# Patient Record
Sex: FEMALE | Race: WHITE | NOT HISPANIC OR LATINO | Employment: OTHER | URBAN - METROPOLITAN AREA
[De-identification: names, ages, dates, MRNs, and addresses within clinical notes are randomized per-mention and may not be internally consistent; named-entity substitution may affect disease eponyms.]

---

## 2017-03-14 PROBLEM — S72.142A FRACTURE, INTERTROCHANTERIC, LEFT FEMUR: Status: ACTIVE | Noted: 2017-03-14

## 2017-04-05 ENCOUNTER — OFFICE VISIT (OUTPATIENT)
Dept: ORTHOPEDICS | Facility: CLINIC | Age: 77
End: 2017-04-05
Payer: MEDICARE

## 2017-04-05 VITALS — BODY MASS INDEX: 16.83 KG/M2 | WEIGHT: 95 LBS | HEIGHT: 63 IN

## 2017-04-05 DIAGNOSIS — Z98.890 S/P ORIF (OPEN REDUCTION INTERNAL FIXATION) FRACTURE: Primary | ICD-10-CM

## 2017-04-05 DIAGNOSIS — Z87.81 S/P ORIF (OPEN REDUCTION INTERNAL FIXATION) FRACTURE: Primary | ICD-10-CM

## 2017-04-05 PROCEDURE — 99212 OFFICE O/P EST SF 10 MIN: CPT | Mod: PBBFAC,PO | Performed by: ORTHOPAEDIC SURGERY

## 2017-04-05 PROCEDURE — 99024 POSTOP FOLLOW-UP VISIT: CPT | Mod: ,,, | Performed by: ORTHOPAEDIC SURGERY

## 2017-04-05 PROCEDURE — 99999 PR PBB SHADOW E&M-EST. PATIENT-LVL II: CPT | Mod: PBBFAC,,, | Performed by: ORTHOPAEDIC SURGERY

## 2017-04-05 NOTE — PROGRESS NOTES
Antonia Case, a few weeks out from ORIF of her intertrochanteric femur fracture   with a [employer] TFN.  She is doing well.  Sutures removed.  She has severe   Alzheimer's disease, but her  showed me a video of her walking in the   yard without any assistive device doing quite well.    At this point, we will continue with activities to tolerance.  We will check her   back in a month's time as a postoperative visit with x-rays of her left femur.      FLYNN/ADILIA  dd: 04/05/2017 10:29:22 (CDT)  td: 04/05/2017 17:35:58 (CDT)  Doc ID   #[id number]  Job ID #[id number]    CC:

## 2017-04-23 PROBLEM — E07.9 THYROID DISEASE: Status: ACTIVE | Noted: 2017-04-23

## 2017-04-23 PROBLEM — F02.80 ALZHEIMER DISEASE: Status: ACTIVE | Noted: 2017-04-23

## 2017-04-23 PROBLEM — S72.22XA CLOSED LEFT SUBTROCHANTERIC FEMUR FRACTURE: Status: ACTIVE | Noted: 2017-04-23

## 2017-04-23 PROBLEM — G30.9 ALZHEIMER DISEASE: Status: ACTIVE | Noted: 2017-04-23

## 2017-04-23 PROBLEM — D64.9 NORMOCYTIC ANEMIA: Status: ACTIVE | Noted: 2017-04-23

## 2017-04-24 PROBLEM — D62 ACUTE BLOOD LOSS ANEMIA: Status: ACTIVE | Noted: 2017-04-24

## 2017-05-10 DIAGNOSIS — G89.18 POST-OP PAIN: Primary | ICD-10-CM

## 2017-05-12 ENCOUNTER — OFFICE VISIT (OUTPATIENT)
Dept: ORTHOPEDICS | Facility: CLINIC | Age: 77
End: 2017-05-12
Payer: MEDICARE

## 2017-05-12 ENCOUNTER — HOSPITAL ENCOUNTER (OUTPATIENT)
Dept: RADIOLOGY | Facility: HOSPITAL | Age: 77
Discharge: HOME | End: 2017-05-12
Attending: ORTHOPAEDIC SURGERY
Payer: MEDICARE

## 2017-05-12 VITALS
BODY MASS INDEX: 18.43 KG/M2 | DIASTOLIC BLOOD PRESSURE: 88 MMHG | HEIGHT: 63 IN | WEIGHT: 104 LBS | HEART RATE: 72 BPM | SYSTOLIC BLOOD PRESSURE: 166 MMHG

## 2017-05-12 DIAGNOSIS — G89.18 POST-OP PAIN: ICD-10-CM

## 2017-05-12 DIAGNOSIS — M89.8X5 PAIN OF LEFT FEMUR: Primary | ICD-10-CM

## 2017-05-12 DIAGNOSIS — Z98.890 S/P ORIF (OPEN REDUCTION INTERNAL FIXATION) FRACTURE: ICD-10-CM

## 2017-05-12 DIAGNOSIS — G89.18 POST-OP PAIN: Primary | ICD-10-CM

## 2017-05-12 DIAGNOSIS — T84.115A: ICD-10-CM

## 2017-05-12 DIAGNOSIS — Z87.81 S/P ORIF (OPEN REDUCTION INTERNAL FIXATION) FRACTURE: ICD-10-CM

## 2017-05-12 PROCEDURE — 99213 OFFICE O/P EST LOW 20 MIN: CPT | Mod: PBBFAC,25,PO | Performed by: ORTHOPAEDIC SURGERY

## 2017-05-12 PROCEDURE — 73552 X-RAY EXAM OF FEMUR 2/>: CPT | Mod: 26,GV,LT, | Performed by: RADIOLOGY

## 2017-05-12 PROCEDURE — 99999 PR PBB SHADOW E&M-EST. PATIENT-LVL III: CPT | Mod: PBBFAC,,, | Performed by: ORTHOPAEDIC SURGERY

## 2017-05-12 PROCEDURE — 99024 POSTOP FOLLOW-UP VISIT: CPT | Mod: ,,, | Performed by: ORTHOPAEDIC SURGERY

## 2017-05-12 NOTE — MR AVS SNAPSHOT
[facility]  [address] [facility] [address]  [place] [address]  Phone: [phone]                  Antonia Case   2017 10:00 AM   Office Visit    Description:  Female : 1940   Provider:  Jorge Stevenson MD   Department:  [place] [facility]           Reason for Visit     Left Hip - Post-op Evaluation           Diagnoses this Visit        Comments    Post-op pain    -  Primary     S/P ORIF (open reduction internal fixation) fracture                To Do List           Future Appointments        Provider Department Dept Phone    2017 10:15 AM Jorge Stevenson MD [facility] [phone]      Goals (5 Years of Data)     None      [facility]     [facility] -  Assistance  Unless otherwise directed by your provider, please contact [facility], our nurse care line that is available for  assistance.     Registered nurses in the [facility] provide: appointment scheduling, clinical advisement, health education, and other advisory services.  Call: [phone] (toll free)               Medications           Message regarding Medications     Verify the changes and/or additions to your medication regime listed below are the same as discussed with your clinician today.  If any of these changes or additions are incorrect, please notify your healthcare provider.             Verify that the below list of medications is an accurate representation of the medications you are currently taking.  If none reported, the list may be blank. If incorrect, please contact your healthcare provider. Carry this list with you in case of emergency.           Current Medications     ,d,-limonene flavor, bulk, 100 % Liqd Take 1 capsule by mouth once daily.    alprazolam (XANAX) 0.5 MG tablet Take 0.25 mg by mouth 2 (two) times daily. 0.25mg  With breakfast and lunch routinely and PRN for combative/frantic behavior.    ASCORBATE CALCIUM (VITAMIN C ORAL)  "Take by mouth.    aspirin 325 MG tablet Take 1 tablet (325 mg total) by mouth 2 (two) times daily. For 30 days, then decrease back to 81 mg PO daily.    CALCIUM CARBONATE/VITAMIN D3 (VITAMIN D-3 ORAL) Take by mouth.    docusate sodium (COLACE) 100 MG capsule Take 1 capsule (100 mg total) by mouth 2 (two) times daily as needed for Constipation.    escitalopram oxalate (LEXAPRO) 10 MG tablet Take 10 mg by mouth once daily.    ferrous gluconate (FERGON) 324 MG tablet Take 1 tablet (324 mg total) by mouth daily with breakfast.    flaxseed oil 1,000 mg Cap Take 1,000 mg by mouth once daily.    galantamine (RAZADYNE) 4 MG Tab Take 4 mg by mouth 2 (two) times daily.    hydrocodone-acetaminophen 5-325mg (NORCO) 5-325 mg per tablet Take 1 tablet by mouth every 6 (six) hours as needed.    levothyroxine (SYNTHROID) 75 MCG tablet Take 75 mcg by mouth. M, W, F    multivitamin (THERAGRAN) per tablet Take 1 tablet by mouth once daily.    ondansetron (ZOFRAN-ODT) 4 MG TbDL Take 1 tablet (4 mg total) by mouth every 8 (eight) hours as needed.    quetiapine (SEROQUEL) 25 MG Tab Take 25 mg by mouth nightly as needed.     turmeric root extract 500 mg Cap Take 500 mg by mouth once daily.    TURMERIC, BULK, MISC 1 tablet by Misc.(Non-Drug; Combo Route) route once daily. Mix in cereal @@ breakfast    VITAMIN B COMPLEX ORAL Take 1 tablet by mouth once daily.           Clinical Reference Information           Your Vitals Were     BP Pulse Height Weight BMI    166/88 72 5' 3" (1.6 m) 47.2 kg (104 lb) 18.42 kg/m2      Blood Pressure          Most Recent Value    BP  (!)  166/88      Allergies as of 5/12/2017     No Known Allergies      Immunizations Administered on Date of Encounter - 5/12/2017     None      Language Assistance Services     ATTENTION: Language assistance services are available, free of charge. Please call [phone].      ATENCIÓN: Si habla mila, tiene a hammer disposición servicios gratuitos de asistencia lingüística. " Jacob bowden [phone].     RAY Ý: N?u b?n nói Ti?ng Vi?t, có các d?ch v? h? tr? ngôn ng? mi?n phí dành cho b?n. G?i s? [phone].         [facility] complies with applicable Federal civil rights laws and does not discriminate on the basis of race, color, national origin, age, disability, or sex.

## 2017-05-16 NOTE — PROGRESS NOTES
Subjective:          Chief Complaint: Antonia Case is a 76 y.o. female who had concerns including Post-op Evaluation of the Left Hip.    HPI Comments: Ms. Knight is here for f//u after her left femur surgery. She fell previously and sustained a left IT fracture of the hip which was treated with a femoral nailing. She was doing well until she subsequently fell and fracture her femoral shaft at the end of the previously placed IM nail. The previous nail was removed and the femur was then treated with a  Long IM CM nail. She was doing well and progressing with ambulation however last Friday her family noted that she regressed and is not ambulating as readily. She is however doing a little better today.     She is non-verbal and has a diagnosis of advanced dementia. She requires 24 hr monitoring in order to protect herself from injury per her .        Past Medical History:   Diagnosis Date    Alzheimer disease     Thyroid disease        Past Surgical History:   Procedure Laterality Date    KNEE SURGERY         No family history on file.      Current Outpatient Prescriptions:     ,d,-limonene flavor, bulk, 100 % Liqd, Take 1 capsule by mouth once daily., Disp: , Rfl:     alprazolam (XANAX) 0.5 MG tablet, Take 0.25 mg by mouth 2 (two) times daily. 0.25mg  With breakfast and lunch routinely and PRN for combative/frantic behavior., Disp: , Rfl:     ASCORBATE CALCIUM (VITAMIN C ORAL), Take by mouth., Disp: , Rfl:     aspirin 325 MG tablet, Take 1 tablet (325 mg total) by mouth 2 (two) times daily. For 30 days, then decrease back to 81 mg PO daily., Disp: , Rfl: 0    CALCIUM CARBONATE/VITAMIN D3 (VITAMIN D-3 ORAL), Take by mouth., Disp: , Rfl:     docusate sodium (COLACE) 100 MG capsule, Take 1 capsule (100 mg total) by mouth 2 (two) times daily as needed for Constipation., Disp: , Rfl: 0    escitalopram oxalate (LEXAPRO) 10 MG tablet, Take 10 mg by mouth once daily., Disp: , Rfl:     ferrous gluconate (FERGON)  324 MG tablet, Take 1 tablet (324 mg total) by mouth daily with breakfast., Disp: , Rfl:     flaxseed oil 1,000 mg Cap, Take 1,000 mg by mouth once daily., Disp: , Rfl:     galantamine (RAZADYNE) 4 MG Tab, Take 4 mg by mouth 2 (two) times daily., Disp: , Rfl:     hydrocodone-acetaminophen 5-325mg (NORCO) 5-325 mg per tablet, Take 1 tablet by mouth every 6 (six) hours as needed., Disp: 20 tablet, Rfl: 0    levothyroxine (SYNTHROID) 75 MCG tablet, Take 75 mcg by mouth. M, W, F, Disp: , Rfl:     multivitamin (THERAGRAN) per tablet, Take 1 tablet by mouth once daily., Disp: , Rfl:     ondansetron (ZOFRAN-ODT) 4 MG TbDL, Take 1 tablet (4 mg total) by mouth every 8 (eight) hours as needed., Disp: 20 tablet, Rfl: 0    quetiapine (SEROQUEL) 25 MG Tab, Take 25 mg by mouth nightly as needed. , Disp: , Rfl:     turmeric root extract 500 mg Cap, Take 500 mg by mouth once daily., Disp: , Rfl:     TURMERIC, BULK, MISC, 1 tablet by Misc.(Non-Drug; Combo Route) route once daily. Mix in cereal @@ breakfast, Disp: , Rfl:     VITAMIN B COMPLEX ORAL, Take 1 tablet by mouth once daily., Disp: , Rfl:     Review of patient's allergies indicates:  No Known Allergies    Vitals:    05/12/17 0935   BP: (!) 166/88   Pulse: 72       Review of Systems   Constitution: Negative for chills and fever.   Musculoskeletal: Positive for arthritis and falls.   Psychiatric/Behavioral: Positive for altered mental status.                   Objective:        General: Antonia is well-developed, well-nourished, appears stated age, in no acute distress, alert and oriented to time, place and person.     General    Vitals reviewed.  Constitutional: She appears well-developed and well-nourished. No distress.   HENT:   Head: Normocephalic and atraumatic.   Nose: Nose normal.   Eyes: Pupils are equal, round, and reactive to light.   Cardiovascular: Normal rate.    Pulmonary/Chest: Effort normal.   Neurological: She is alert.   Psychiatric: She has a normal  mood and affect. Her behavior is normal. Judgment and thought content normal.     General Musculoskeletal Exam   Gait: antalgic       Right Knee Exam   Right knee exam is normal.    Left Knee Exam   Left knee exam is normal.    Range of Motion   The patient has normal left knee ROM.    Comments:  Passive ROM is intact however she will not follow commands to active flex and extend her knee. She does go from a seated position to a standing position from her wheelchair and shuffles, repeatedly during her examLeft Hip Exam     Inspection   Swelling: present  Bruising: present    Comments:  Incisions are healing and ecchymosis is resolving. No appreciable pain demonstrated with palpation of the left hip/femur or distal femur              Current and previous radiographic studies and results were reviewed with the patient:   There are postoperative changes of ORIF of an oblique left femoral mid shaft fracture.  There is left gluteus tendon greater trochanteric enthesophyte formation.  The patient's intramedullary florencia has been apparently replaced with a newer longer stem intramedullary florencia.  there is a fractured distal left femoral interlocking screw.  No appreciable interval change in healing or alignment of the patient's femoral shaft fracture site as compared to the previous study.  There is moderate left lateral and mild-moderate left medial tibiofemoral joint space narrowing.      Assessment:       Encounter Diagnoses   Name Primary?    Post-op pain Yes    S/P ORIF (open reduction internal fixation) fracture     Mechanical breakdown of internal fixation device of left femur, initial encounter           Plan:         Discussed x-ray findings with the patient's  and the hardware failure  At this time we will continue to monitor her progress and see if her level of function rebounds  I believe that her set back is likely due to her feeling when the hardware failed and having an increase in pain which lead to  her decreased ambulation. She cannot verbalize or communicate that to us and since her  does feel that she is doing more now than last weekend, I feel it's appropriate to monitor her  Continue with advancing activities as tolerated  If there are no changes in fracture positioning, hardware position and if the patient rebounds with her walking ability we will not elect to revise her femoral fixation

## 2017-05-30 ENCOUNTER — HOSPITAL ENCOUNTER (OUTPATIENT)
Dept: RADIOLOGY | Facility: HOSPITAL | Age: 77
Discharge: HOME | End: 2017-05-30
Attending: ORTHOPAEDIC SURGERY
Payer: MEDICARE

## 2017-05-30 ENCOUNTER — OFFICE VISIT (OUTPATIENT)
Dept: ORTHOPEDICS | Facility: CLINIC | Age: 77
End: 2017-05-30
Payer: MEDICARE

## 2017-05-30 VITALS — HEIGHT: 63 IN | BODY MASS INDEX: 18.43 KG/M2 | WEIGHT: 104 LBS

## 2017-05-30 DIAGNOSIS — G89.18 POST-OP PAIN: Primary | ICD-10-CM

## 2017-05-30 DIAGNOSIS — Z98.890 S/P ORIF (OPEN REDUCTION INTERNAL FIXATION) FRACTURE: ICD-10-CM

## 2017-05-30 DIAGNOSIS — Z87.81 S/P ORIF (OPEN REDUCTION INTERNAL FIXATION) FRACTURE: ICD-10-CM

## 2017-05-30 DIAGNOSIS — M89.8X5 PAIN OF LEFT FEMUR: ICD-10-CM

## 2017-05-30 DIAGNOSIS — T85.848D PAIN FROM IMPLANTED HARDWARE, SUBSEQUENT ENCOUNTER: ICD-10-CM

## 2017-05-30 PROCEDURE — 99999 PR PBB SHADOW E&M-EST. PATIENT-LVL III: CPT | Mod: PBBFAC,,, | Performed by: ORTHOPAEDIC SURGERY

## 2017-05-30 PROCEDURE — 99024 POSTOP FOLLOW-UP VISIT: CPT | Mod: ,,, | Performed by: ORTHOPAEDIC SURGERY

## 2017-05-30 PROCEDURE — 73552 X-RAY EXAM OF FEMUR 2/>: CPT | Mod: 26,GW,LT, | Performed by: RADIOLOGY

## 2017-05-30 PROCEDURE — 99213 OFFICE O/P EST LOW 20 MIN: CPT | Mod: PBBFAC,25,PO | Performed by: ORTHOPAEDIC SURGERY

## 2017-05-30 NOTE — PROGRESS NOTES
Subjective:          Chief Complaint: Antonia Case is a 76 y.o. female who had concerns including Post-op Evaluation of the Left Hip.    Ms. Knight is here for f//u after her left femur surgery. She fell previously and sustained a left IT fracture of the hip which was treated with a femoral nailing. She was doing well until she subsequently fell and fracture her femoral shaft at the end of the previously placed IM nail. The previous nail was removed and the femur was then treated with a  Long IM CM nail. She was doing well and progressing with ambulation however she has failure of her distal hardware and is here for f/u.    She is non-verbal and has a diagnosis of advanced dementia. She requires 24 hr monitoring in order to protect herself from injury per her .          Past Medical History:   Diagnosis Date    Alzheimer disease     Thyroid disease        Past Surgical History:   Procedure Laterality Date    KNEE SURGERY         History reviewed. No pertinent family history.      Current Outpatient Prescriptions:     ,d,-limonene flavor, bulk, 100 % Liqd, Take 1 capsule by mouth once daily., Disp: , Rfl:     alprazolam (XANAX) 0.5 MG tablet, Take 0.25 mg by mouth 2 (two) times daily. 0.25mg  With breakfast and lunch routinely and PRN for combative/frantic behavior., Disp: , Rfl:     ASCORBATE CALCIUM (VITAMIN C ORAL), Take by mouth., Disp: , Rfl:     aspirin 325 MG tablet, Take 1 tablet (325 mg total) by mouth 2 (two) times daily. For 30 days, then decrease back to 81 mg PO daily., Disp: , Rfl: 0    CALCIUM CARBONATE/VITAMIN D3 (VITAMIN D-3 ORAL), Take by mouth., Disp: , Rfl:     docusate sodium (COLACE) 100 MG capsule, Take 1 capsule (100 mg total) by mouth 2 (two) times daily as needed for Constipation., Disp: , Rfl: 0    escitalopram oxalate (LEXAPRO) 10 MG tablet, Take 10 mg by mouth once daily., Disp: , Rfl:     ferrous gluconate (FERGON) 324 MG tablet, Take 1 tablet (324 mg total) by mouth daily  with breakfast., Disp: , Rfl:     flaxseed oil 1,000 mg Cap, Take 1,000 mg by mouth once daily., Disp: , Rfl:     galantamine (RAZADYNE) 4 MG Tab, Take 4 mg by mouth 2 (two) times daily., Disp: , Rfl:     hydrocodone-acetaminophen 5-325mg (NORCO) 5-325 mg per tablet, Take 1 tablet by mouth every 6 (six) hours as needed., Disp: 20 tablet, Rfl: 0    levothyroxine (SYNTHROID) 75 MCG tablet, Take 75 mcg by mouth. M, W, F, Disp: , Rfl:     multivitamin (THERAGRAN) per tablet, Take 1 tablet by mouth once daily., Disp: , Rfl:     ondansetron (ZOFRAN-ODT) 4 MG TbDL, Take 1 tablet (4 mg total) by mouth every 8 (eight) hours as needed., Disp: 20 tablet, Rfl: 0    quetiapine (SEROQUEL) 25 MG Tab, Take 25 mg by mouth nightly as needed. , Disp: , Rfl:     turmeric root extract 500 mg Cap, Take 500 mg by mouth once daily., Disp: , Rfl:     TURMERIC, BULK, MISC, 1 tablet by Misc.(Non-Drug; Combo Route) route once daily. Mix in cereal @@ breakfast, Disp: , Rfl:     VITAMIN B COMPLEX ORAL, Take 1 tablet by mouth once daily., Disp: , Rfl:     Review of patient's allergies indicates:  No Known Allergies    There were no vitals filed for this visit.    Review of Systems   Constitution: Negative for chills and fever.   Musculoskeletal: Positive for arthritis and falls.   Psychiatric/Behavioral: Positive for altered mental status.                   Objective:        General: Antonia is well-developed, well-nourished, appears stated age, in no acute distress, alert and oriented to time, place and person.     General    Vitals reviewed.  Constitutional: She appears well-developed and well-nourished. No distress.   HENT:   Head: Normocephalic and atraumatic.   Nose: Nose normal.   Eyes: Pupils are equal, round, and reactive to light.   Cardiovascular: Normal rate.    Pulmonary/Chest: Effort normal.   Neurological: She is alert.   Psychiatric: She has a normal mood and affect. Her behavior is normal. Judgment and thought content  normal.     General Musculoskeletal Exam   Gait: antalgic       Right Knee Exam   Right knee exam is normal.    Left Knee Exam   Left knee exam is normal.    Range of Motion   The patient has normal left knee ROM.    Comments:  Passive ROM is intact however she will not follow commands to active flex and extend her knee. She does go from a seated position to a standing position from her wheelchair and shuffles, repeatedly during her exam    Obvious left lower extremity shortening noted on examLeft Hip Exam     Inspection   Swelling: present  Bruising: present    Comments:  Incisions are healing and ecchymosis is resolving. No appreciable pain demonstrated with palpation of the left hip/femur or distal femur          Vascular Exam       Left Pulses  Dorsalis Pedis:      2+  Posterior Tibial:      2+        Edema  Left Lower Leg: absent        Current and previous radiographic studies and results were reviewed with the patient:   There are postoperative changes of ORIF of an oblique left femoral mid shaft fracture.  There is left gluteus tendon greater trochanteric enthesophyte formation.  The patient's intramedullary florencia has been apparently replaced with a newer longer stem intramedullary florencia.  there is a fractured distal left femoral interlocking screw.  No appreciable interval change in healing or alignment of the patient's femoral shaft fracture site as compared to the previous study.  There is moderate left lateral and mild-moderate left medial tibiofemoral joint space narrowing.      Assessment:       Encounter Diagnoses   Name Primary?    Post-op pain Yes    S/P ORIF (open reduction internal fixation) fracture           Plan:         Discussed x-ray findings with the patient's  and the hardware failure and now left femoral shortening  At this time we will continue to monitor her progress and see if her level of function rebounds  Decrease walking  If there are no changes in fracture positioning,  hardware position and if the patient rebounds with her walking ability we will not elect to revise her femoral fixation; however if the hardware begins to penetrate the distal femur and knee joint I will recommend that she be revised

## 2017-06-08 DIAGNOSIS — M25.552 LEFT HIP PAIN: Primary | ICD-10-CM

## 2017-06-13 ENCOUNTER — HOSPITAL ENCOUNTER (OUTPATIENT)
Dept: RADIOLOGY | Facility: HOSPITAL | Age: 77
Discharge: HOME | End: 2017-06-13
Attending: ORTHOPAEDIC SURGERY
Payer: MEDICARE

## 2017-06-13 ENCOUNTER — OFFICE VISIT (OUTPATIENT)
Dept: ORTHOPEDICS | Facility: CLINIC | Age: 77
End: 2017-06-13
Payer: MEDICARE

## 2017-06-13 VITALS
HEART RATE: 88 BPM | DIASTOLIC BLOOD PRESSURE: 67 MMHG | HEIGHT: 63 IN | BODY MASS INDEX: 18.43 KG/M2 | SYSTOLIC BLOOD PRESSURE: 138 MMHG | WEIGHT: 104 LBS

## 2017-06-13 DIAGNOSIS — Z98.890 S/P ORIF (OPEN REDUCTION INTERNAL FIXATION) FRACTURE: ICD-10-CM

## 2017-06-13 DIAGNOSIS — M21.70 ACQUIRED LEG LENGTH DISCREPANCY: ICD-10-CM

## 2017-06-13 DIAGNOSIS — Z87.81 S/P ORIF (OPEN REDUCTION INTERNAL FIXATION) FRACTURE: ICD-10-CM

## 2017-06-13 DIAGNOSIS — M25.552 LEFT HIP PAIN: ICD-10-CM

## 2017-06-13 DIAGNOSIS — G89.18 POST-OP PAIN: Primary | ICD-10-CM

## 2017-06-13 PROCEDURE — 73552 X-RAY EXAM OF FEMUR 2/>: CPT | Mod: 26,GW,LT, | Performed by: RADIOLOGY

## 2017-06-13 PROCEDURE — 99999 PR PBB SHADOW E&M-EST. PATIENT-LVL III: CPT | Mod: PBBFAC,,, | Performed by: ORTHOPAEDIC SURGERY

## 2017-06-13 PROCEDURE — 99213 OFFICE O/P EST LOW 20 MIN: CPT | Mod: PBBFAC,25,PO | Performed by: ORTHOPAEDIC SURGERY

## 2017-06-13 PROCEDURE — 99024 POSTOP FOLLOW-UP VISIT: CPT | Mod: ,,, | Performed by: ORTHOPAEDIC SURGERY

## 2017-06-14 NOTE — PROGRESS NOTES
Subjective:          Chief Complaint: Antonia Case is a 76 y.o. female who had concerns including Post-op Evaluation of the Left Hip.    Ms. Knight is here for f//u after her left femur surgery. She fell previously and sustained a left IT fracture of the hip which was treated with a femoral nailing. She was doing well until she subsequently fell and fracture her femoral shaft at the end of the previously placed IM nail. The previous nail was removed and the femur was then treated with a  Long IM CM nail. She was doing well and progressing with ambulation however she has failure of her distal hardware and is here for f/u. She seems to be better today insofar as her demeanor and being able to cooperate with exam and x-rays.     She is non-verbal and has a diagnosis of advanced dementia. She requires 24 hr monitoring in order to protect herself from injury per her .          Past Medical History:   Diagnosis Date    Alzheimer disease     Thyroid disease        Past Surgical History:   Procedure Laterality Date    KNEE SURGERY         History reviewed. No pertinent family history.      Current Outpatient Prescriptions:     ,d,-limonene flavor, bulk, 100 % Liqd, Take 1 capsule by mouth once daily., Disp: , Rfl:     alprazolam (XANAX) 0.5 MG tablet, Take 0.25 mg by mouth 2 (two) times daily. 0.25mg  With breakfast and lunch routinely and PRN for combative/frantic behavior., Disp: , Rfl:     ASCORBATE CALCIUM (VITAMIN C ORAL), Take by mouth., Disp: , Rfl:     aspirin 325 MG tablet, Take 1 tablet (325 mg total) by mouth 2 (two) times daily. For 30 days, then decrease back to 81 mg PO daily., Disp: , Rfl: 0    CALCIUM CARBONATE/VITAMIN D3 (VITAMIN D-3 ORAL), Take by mouth., Disp: , Rfl:     docusate sodium (COLACE) 100 MG capsule, Take 1 capsule (100 mg total) by mouth 2 (two) times daily as needed for Constipation., Disp: , Rfl: 0    escitalopram oxalate (LEXAPRO) 10 MG tablet, Take 10 mg by mouth once daily.,  Disp: , Rfl:     ferrous gluconate (FERGON) 324 MG tablet, Take 1 tablet (324 mg total) by mouth daily with breakfast., Disp: , Rfl:     flaxseed oil 1,000 mg Cap, Take 1,000 mg by mouth once daily., Disp: , Rfl:     galantamine (RAZADYNE) 4 MG Tab, Take 4 mg by mouth 2 (two) times daily., Disp: , Rfl:     hydrocodone-acetaminophen 5-325mg (NORCO) 5-325 mg per tablet, Take 1 tablet by mouth every 6 (six) hours as needed., Disp: 20 tablet, Rfl: 0    levothyroxine (SYNTHROID) 75 MCG tablet, Take 75 mcg by mouth. M, W, F, Disp: , Rfl:     multivitamin (THERAGRAN) per tablet, Take 1 tablet by mouth once daily., Disp: , Rfl:     ondansetron (ZOFRAN-ODT) 4 MG TbDL, Take 1 tablet (4 mg total) by mouth every 8 (eight) hours as needed., Disp: 20 tablet, Rfl: 0    quetiapine (SEROQUEL) 25 MG Tab, Take 25 mg by mouth nightly as needed. , Disp: , Rfl:     turmeric root extract 500 mg Cap, Take 500 mg by mouth once daily., Disp: , Rfl:     TURMERIC, BULK, MISC, 1 tablet by Misc.(Non-Drug; Combo Route) route once daily. Mix in cereal @@ breakfast, Disp: , Rfl:     VITAMIN B COMPLEX ORAL, Take 1 tablet by mouth once daily., Disp: , Rfl:     Review of patient's allergies indicates:  No Known Allergies    Vitals:    06/13/17 1059   BP: 138/67   Pulse: 88       Review of Systems   Constitution: Negative for chills and fever.   Musculoskeletal: Positive for arthritis and falls.   Psychiatric/Behavioral: Positive for altered mental status.                   Objective:        General: Antonia is well-developed, well-nourished, appears stated age, in no acute distress, alert and oriented to time, place and person.     General    Vitals reviewed.  Constitutional: She appears well-developed and well-nourished. No distress.   HENT:   Head: Normocephalic and atraumatic.   Nose: Nose normal.   Eyes: Pupils are equal, round, and reactive to light.   Cardiovascular: Normal rate.    Pulmonary/Chest: Effort normal.   Neurological: She is  alert.   Psychiatric: She has a normal mood and affect. Her behavior is normal. Judgment and thought content normal.     General Musculoskeletal Exam   Gait: antalgic       Right Knee Exam   Right knee exam is normal.    Left Knee Exam   Left knee exam is normal.    Range of Motion   The patient has normal left knee ROM.    Comments:  Passive ROM is intact however she will not follow commands to active flex and extend her knee. She does go from a seated position to a standing position from her wheelchair and shuffles, repeatedly during her exam    Obvious left lower extremity shortening noted on examLeft Hip Exam     Inspection   Scars: present  Swelling: absent  Bruising: absent    Comments:  Incisions are healed. No appreciable pain demonstrated with palpation of the left hip/femur or distal femur          Vascular Exam       Left Pulses  Dorsalis Pedis:      2+  Posterior Tibial:      2+        Edema  Left Lower Leg: absent        Current and previous radiographic studies and results were reviewed with the patient:   There are postoperative changes of ORIF of an oblique left femoral mid shaft fracture.  There is left gluteus tendon greater trochanteric enthesophyte formation.  The patient's intramedullary florencia has been apparently replaced with a newer longer stem intramedullary florencia.  there is a fractured distal left femoral interlocking screw.  No appreciable interval change in healing or alignment of the patient's femoral shaft fracture site as compared to the previous study.  There is moderate left lateral and mild-moderate left medial tibiofemoral joint space narrowing.      Assessment:       Encounter Diagnoses   Name Primary?    Post-op pain Yes    S/P ORIF (open reduction internal fixation) fracture     Acquired leg length discrepancy           Plan:         Discussed x-ray findings with the patient's  and the hardware failure and now left femoral shortening that has not progressed since the last  x-rays based on location of the distal end of the naile  At this time we will continue to monitor her progress and see if her level of function rebounds  Will right for a correction of her now LLD and encourage and increase in activities.  If there are no changes in fracture positioning, hardware position and if the patient rebounds with her walking ability we will not elect to revise her femoral fixation; however if the hardware begins to penetrate the distal femur and knee joint I will recommend that she be revised  Will f/u in 4 weeks with x-rays

## 2017-07-10 DIAGNOSIS — M25.552 LEFT HIP PAIN: Primary | ICD-10-CM

## 2017-07-11 ENCOUNTER — HOSPITAL ENCOUNTER (OUTPATIENT)
Dept: RADIOLOGY | Facility: HOSPITAL | Age: 77
Discharge: HOME | End: 2017-07-11
Attending: ORTHOPAEDIC SURGERY
Payer: MEDICARE

## 2017-07-11 ENCOUNTER — TELEPHONE (OUTPATIENT)
Dept: ORTHOPEDICS | Facility: CLINIC | Age: 77
End: 2017-07-11

## 2017-07-11 ENCOUNTER — OFFICE VISIT (OUTPATIENT)
Dept: ORTHOPEDICS | Facility: CLINIC | Age: 77
End: 2017-07-11
Payer: MEDICARE

## 2017-07-11 VITALS
DIASTOLIC BLOOD PRESSURE: 66 MMHG | WEIGHT: 104 LBS | SYSTOLIC BLOOD PRESSURE: 143 MMHG | BODY MASS INDEX: 18.43 KG/M2 | HEIGHT: 63 IN | HEART RATE: 82 BPM

## 2017-07-11 DIAGNOSIS — M21.70 ACQUIRED LEG LENGTH DISCREPANCY: Primary | ICD-10-CM

## 2017-07-11 DIAGNOSIS — Z98.890 S/P ORIF (OPEN REDUCTION INTERNAL FIXATION) FRACTURE: ICD-10-CM

## 2017-07-11 DIAGNOSIS — G89.18 POST-OP PAIN: ICD-10-CM

## 2017-07-11 DIAGNOSIS — M25.552 LEFT HIP PAIN: ICD-10-CM

## 2017-07-11 DIAGNOSIS — Z87.81 S/P ORIF (OPEN REDUCTION INTERNAL FIXATION) FRACTURE: ICD-10-CM

## 2017-07-11 DIAGNOSIS — Z96.9 PRESENCE OF RETAINED HARDWARE: ICD-10-CM

## 2017-07-11 PROCEDURE — 99024 POSTOP FOLLOW-UP VISIT: CPT | Mod: ,,, | Performed by: ORTHOPAEDIC SURGERY

## 2017-07-11 PROCEDURE — 99213 OFFICE O/P EST LOW 20 MIN: CPT | Mod: PBBFAC,25,PO | Performed by: ORTHOPAEDIC SURGERY

## 2017-07-11 PROCEDURE — 99999 PR PBB SHADOW E&M-EST. PATIENT-LVL III: CPT | Mod: PBBFAC,,, | Performed by: ORTHOPAEDIC SURGERY

## 2017-07-11 PROCEDURE — 73552 X-RAY EXAM OF FEMUR 2/>: CPT | Mod: 26,GW,LT,GC | Performed by: RADIOLOGY

## 2017-07-11 NOTE — TELEPHONE ENCOUNTER
----- Message from Alberta Yost sent at 7/11/2017  4:07 PM CDT -----  /Richard is calling to speak to the doctor himself regarding patient's operation on 3/13/17. Please have doctor call. There is another problem that has surfaced. Please call back at [phone] or [phone].  says it is important.

## 2017-07-13 NOTE — PROGRESS NOTES
Subjective:          Chief Complaint: Antonia Case is a 76 y.o. female who had concerns including Pain and Post-op Evaluation of the Left Hip.    Ms. Knight is here for f//u after her left femur surgery. She fell previously and sustained a left IT fracture of the hip which was treated with a femoral nailing. She was doing well until she subsequently fell and fracture her femoral shaft at the end of the previously placed IM nail. The previous nail was removed and the femur was then treated with a  Long IM CM nail. She was doing well and progressing with ambulation however she has failure of her distal hardware and is here for f/u. She is ambulating today without assistance. The left shoe lift has been added to the shoe since her last visit.    She is non-verbal and has a diagnosis of advanced dementia. She requires 24 hr monitoring in order to protect herself from injury per her .      Pain   Pertinent negatives include no chills or fever.         Past Medical History:   Diagnosis Date    Alzheimer disease     Thyroid disease        Past Surgical History:   Procedure Laterality Date    KNEE SURGERY         No family history on file.      Current Outpatient Prescriptions:     ,d,-limonene flavor, bulk, 100 % Liqd, Take 1 capsule by mouth once daily., Disp: , Rfl:     alprazolam (XANAX) 0.5 MG tablet, Take 0.25 mg by mouth 2 (two) times daily. 0.25mg  With breakfast and lunch routinely and PRN for combative/frantic behavior., Disp: , Rfl:     ASCORBATE CALCIUM (VITAMIN C ORAL), Take by mouth., Disp: , Rfl:     aspirin 325 MG tablet, Take 1 tablet (325 mg total) by mouth 2 (two) times daily. For 30 days, then decrease back to 81 mg PO daily., Disp: , Rfl: 0    CALCIUM CARBONATE/VITAMIN D3 (VITAMIN D-3 ORAL), Take by mouth., Disp: , Rfl:     docusate sodium (COLACE) 100 MG capsule, Take 1 capsule (100 mg total) by mouth 2 (two) times daily as needed for Constipation., Disp: , Rfl: 0    escitalopram oxalate  (LEXAPRO) 10 MG tablet, Take 10 mg by mouth once daily., Disp: , Rfl:     ferrous gluconate (FERGON) 324 MG tablet, Take 1 tablet (324 mg total) by mouth daily with breakfast., Disp: , Rfl:     flaxseed oil 1,000 mg Cap, Take 1,000 mg by mouth once daily., Disp: , Rfl:     galantamine (RAZADYNE) 4 MG Tab, Take 4 mg by mouth 2 (two) times daily., Disp: , Rfl:     hydrocodone-acetaminophen 5-325mg (NORCO) 5-325 mg per tablet, Take 1 tablet by mouth every 6 (six) hours as needed., Disp: 20 tablet, Rfl: 0    levothyroxine (SYNTHROID) 75 MCG tablet, Take 75 mcg by mouth. M, W, F, Disp: , Rfl:     multivitamin (THERAGRAN) per tablet, Take 1 tablet by mouth once daily., Disp: , Rfl:     ondansetron (ZOFRAN-ODT) 4 MG TbDL, Take 1 tablet (4 mg total) by mouth every 8 (eight) hours as needed., Disp: 20 tablet, Rfl: 0    quetiapine (SEROQUEL) 25 MG Tab, Take 25 mg by mouth nightly as needed. , Disp: , Rfl:     turmeric root extract 500 mg Cap, Take 500 mg by mouth once daily., Disp: , Rfl:     TURMERIC, BULK, MISC, 1 tablet by Misc.(Non-Drug; Combo Route) route once daily. Mix in cereal @@ breakfast, Disp: , Rfl:     VITAMIN B COMPLEX ORAL, Take 1 tablet by mouth once daily., Disp: , Rfl:     Review of patient's allergies indicates:  No Known Allergies    Vitals:    07/11/17 1107   BP: (!) 143/66   Pulse: 82       Review of Systems   Constitution: Negative for chills and fever.   Musculoskeletal: Positive for arthritis and falls.   Psychiatric/Behavioral: Positive for altered mental status.                   Objective:        General: Antonia is well-developed, well-nourished, appears stated age, in no acute distress, alert and oriented to time, place and person.     General    Vitals reviewed.  Constitutional: She appears well-developed and well-nourished. No distress.   HENT:   Head: Normocephalic and atraumatic.   Nose: Nose normal.   Eyes: Pupils are equal, round, and reactive to light.   Cardiovascular: Normal  rate.    Pulmonary/Chest: Effort normal.   Neurological: She is alert.   Psychiatric: She has a normal mood and affect. Her behavior is normal. Judgment and thought content normal.     General Musculoskeletal Exam   Gait: antalgic       Right Knee Exam   Right knee exam is normal.    Left Knee Exam   Left knee exam is normal.    Range of Motion   The patient has normal left knee ROM.    Comments:  She rises and sits multiple times during the visit. She does not appear to be in pain, however it is difficult to determine.    Obvious left lower extremity shortening noted on examLeft Hip Exam     Inspection   Scars: present  Swelling: absent  Bruising: absent    Comments:  Incisions are healed. No appreciable pain demonstrated with palpation of the left hip/femur or distal femur          Vascular Exam       Left Pulses  Dorsalis Pedis:      2+  Posterior Tibial:      2+        Edema  Left Lower Leg: absent        Current and previous radiographic studies and results were reviewed with the patient:   There are postoperative changes of ORIF of a left femoral mid shaft fracture utilizing IM nail fixation.  There is been interval progression of healing of the patient's fracture site when compared to the previous study.  No new fractures.  There is continued demonstration of lucency surrounding the patient's more proximal distal interlocking screw.  The screw is likely fractured.  There is a fractured interlocking screw present the most distal aspect of the intramedullary florencia.    There is left greater trochanteric enthesophyte formation.  There is degenerative change of the left knee    There has been a change in distance from distal end of nail to medial FC by 3mm.        Assessment:       Encounter Diagnoses   Name Primary?    Left hip pain     Post-op pain     S/P ORIF (open reduction internal fixation) fracture     Acquired leg length discrepancy Yes    Presence of retained hardware           Plan:         Discussed  "x-ray findings with the patient's  and the hardware failure and now left femoral shortening that has progressed minimally since the last x-rays based on location of the distal end of the nail. I discussed the options with the . IF he feels that her LLD and function are not optimal for her care, the option is to remove the current nail and I would choose to plate the femur.   The patient's  stated that "this is a disaster and they will go home and decide what to do".   I explained that he should call and let us know and at that time we will discuss the particulars of the case.  I will also review the films with our radiology department to ensure measurements from the distal end of the nail are accurate.              "

## 2017-07-13 NOTE — PROGRESS NOTES
Subjective:          Chief Complaint: Antonia Case is a 76 y.o. female who had no chief complaint listed for this encounter.    Ms. Knight is here for f//u after her left femur surgery. She fell previously and sustained a left IT fracture of the hip which was treated with a femoral nailing. She was doing well until she subsequently fell and fracture her femoral shaft at the end of the previously placed IM nail. The previous nail was removed and the femur was then treated with a  Long IM CM nail. She was doing well and progressing with ambulation however she has failure of her distal hardware and is here for f/u. She seems to be better today insofar as her demeanor and being able to cooperate with exam and x-rays.     She is non-verbal and has a diagnosis of advanced dementia. She requires 24 hr monitoring in order to protect herself from injury per her .          Past Medical History:   Diagnosis Date    Alzheimer disease     Thyroid disease        Past Surgical History:   Procedure Laterality Date    KNEE SURGERY         No family history on file.      Current Outpatient Prescriptions:     ,d,-limonene flavor, bulk, 100 % Liqd, Take 1 capsule by mouth once daily., Disp: , Rfl:     alprazolam (XANAX) 0.5 MG tablet, Take 0.25 mg by mouth 2 (two) times daily. 0.25mg  With breakfast and lunch routinely and PRN for combative/frantic behavior., Disp: , Rfl:     ASCORBATE CALCIUM (VITAMIN C ORAL), Take by mouth., Disp: , Rfl:     aspirin 325 MG tablet, Take 1 tablet (325 mg total) by mouth 2 (two) times daily. For 30 days, then decrease back to 81 mg PO daily., Disp: , Rfl: 0    CALCIUM CARBONATE/VITAMIN D3 (VITAMIN D-3 ORAL), Take by mouth., Disp: , Rfl:     docusate sodium (COLACE) 100 MG capsule, Take 1 capsule (100 mg total) by mouth 2 (two) times daily as needed for Constipation., Disp: , Rfl: 0    escitalopram oxalate (LEXAPRO) 10 MG tablet, Take 10 mg by mouth once daily., Disp: , Rfl:     ferrous  gluconate (FERGON) 324 MG tablet, Take 1 tablet (324 mg total) by mouth daily with breakfast., Disp: , Rfl:     flaxseed oil 1,000 mg Cap, Take 1,000 mg by mouth once daily., Disp: , Rfl:     galantamine (RAZADYNE) 4 MG Tab, Take 4 mg by mouth 2 (two) times daily., Disp: , Rfl:     hydrocodone-acetaminophen 5-325mg (NORCO) 5-325 mg per tablet, Take 1 tablet by mouth every 6 (six) hours as needed., Disp: 20 tablet, Rfl: 0    levothyroxine (SYNTHROID) 75 MCG tablet, Take 75 mcg by mouth. M, W, F, Disp: , Rfl:     multivitamin (THERAGRAN) per tablet, Take 1 tablet by mouth once daily., Disp: , Rfl:     ondansetron (ZOFRAN-ODT) 4 MG TbDL, Take 1 tablet (4 mg total) by mouth every 8 (eight) hours as needed., Disp: 20 tablet, Rfl: 0    quetiapine (SEROQUEL) 25 MG Tab, Take 25 mg by mouth nightly as needed. , Disp: , Rfl:     turmeric root extract 500 mg Cap, Take 500 mg by mouth once daily., Disp: , Rfl:     TURMERIC, BULK, MISC, 1 tablet by Misc.(Non-Drug; Combo Route) route once daily. Mix in cereal @@ breakfast, Disp: , Rfl:     VITAMIN B COMPLEX ORAL, Take 1 tablet by mouth once daily., Disp: , Rfl:     Review of patient's allergies indicates:  No Known Allergies    There were no vitals filed for this visit.    Review of Systems   Constitution: Negative for chills and fever.   Musculoskeletal: Positive for arthritis and falls.   Psychiatric/Behavioral: Positive for altered mental status.                   Objective:        General: Antonia is well-developed, well-nourished, appears stated age, in no acute distress, alert and oriented to time, place and person.     General    Vitals reviewed.  Constitutional: She appears well-developed and well-nourished. No distress.   HENT:   Head: Normocephalic and atraumatic.   Nose: Nose normal.   Eyes: Pupils are equal, round, and reactive to light.   Cardiovascular: Normal rate.    Pulmonary/Chest: Effort normal.   Neurological: She is alert.   Psychiatric: She has a  normal mood and affect. Her behavior is normal. Judgment and thought content normal.     General Musculoskeletal Exam   Gait: antalgic       Right Knee Exam   Right knee exam is normal.    Left Knee Exam   Left knee exam is normal.    Range of Motion   The patient has normal left knee ROM.    Comments:  Passive ROM is intact however she will not follow commands to active flex and extend her knee. She does go from a seated position to a standing position from her wheelchair and shuffles, repeatedly during her exam    Obvious left lower extremity shortening noted on examLeft Hip Exam     Inspection   Scars: present  Swelling: absent  Bruising: absent    Comments:  Incisions are healed. No appreciable pain demonstrated with palpation of the left hip/femur or distal femur          Vascular Exam       Left Pulses  Dorsalis Pedis:      2+  Posterior Tibial:      2+        Edema  Left Lower Leg: absent        Current and previous radiographic studies and results were reviewed with the patient:   There are postoperative changes of ORIF of an oblique left femoral mid shaft fracture.  There is left gluteus tendon greater trochanteric enthesophyte formation.  The patient's intramedullary florencia has been apparently replaced with a newer longer stem intramedullary florencia.  there is a fractured distal left femoral interlocking screw.  No appreciable interval change in healing or alignment of the patient's femoral shaft fracture site as compared to the previous study.  There is moderate left lateral and mild-moderate left medial tibiofemoral joint space narrowing.      Assessment:       Encounter Diagnosis   Name Primary?    Closed displaced subtrochanteric fracture of left femur with malunion, subsequent encounter Yes          Plan:

## 2017-07-14 ENCOUNTER — OFFICE VISIT (OUTPATIENT)
Dept: ORTHOPEDICS | Facility: CLINIC | Age: 77
End: 2017-07-14
Payer: MEDICARE

## 2017-07-14 VITALS — HEIGHT: 63 IN | BODY MASS INDEX: 18.44 KG/M2 | WEIGHT: 104.06 LBS

## 2017-07-14 DIAGNOSIS — Z71.0 ENCOUNTER FOR FAMILY CONFERENCE WITHOUT PATIENT PRESENT: Primary | ICD-10-CM

## 2017-07-14 PROCEDURE — 99999 PR PBB SHADOW E&M-EST. PATIENT-LVL II: CPT | Mod: PBBFAC,,, | Performed by: ORTHOPAEDIC SURGERY

## 2017-07-14 PROCEDURE — 99024 POSTOP FOLLOW-UP VISIT: CPT | Mod: S$PBB,,, | Performed by: ORTHOPAEDIC SURGERY

## 2017-07-14 PROCEDURE — 1159F MED LIST DOCD IN RCRD: CPT | Mod: ,,, | Performed by: ORTHOPAEDIC SURGERY

## 2017-07-14 PROCEDURE — 99212 OFFICE O/P EST SF 10 MIN: CPT | Mod: PBBFAC,PO | Performed by: ORTHOPAEDIC SURGERY

## 2017-07-31 DIAGNOSIS — M89.8X5 PAIN OF LEFT FEMUR: Primary | ICD-10-CM

## 2017-08-01 ENCOUNTER — HOSPITAL ENCOUNTER (OUTPATIENT)
Dept: RADIOLOGY | Facility: HOSPITAL | Age: 77
Discharge: HOME | End: 2017-08-01
Attending: ORTHOPAEDIC SURGERY
Payer: MEDICARE

## 2017-08-01 ENCOUNTER — OFFICE VISIT (OUTPATIENT)
Dept: ORTHOPEDICS | Facility: CLINIC | Age: 77
End: 2017-08-01
Payer: MEDICARE

## 2017-08-01 VITALS
HEIGHT: 63 IN | DIASTOLIC BLOOD PRESSURE: 67 MMHG | BODY MASS INDEX: 18.43 KG/M2 | SYSTOLIC BLOOD PRESSURE: 115 MMHG | HEART RATE: 68 BPM | WEIGHT: 104 LBS

## 2017-08-01 DIAGNOSIS — Z87.81 S/P ORIF (OPEN REDUCTION INTERNAL FIXATION) FRACTURE: ICD-10-CM

## 2017-08-01 DIAGNOSIS — Z98.890 S/P ORIF (OPEN REDUCTION INTERNAL FIXATION) FRACTURE: ICD-10-CM

## 2017-08-01 DIAGNOSIS — M89.8X5 PAIN OF LEFT FEMUR: ICD-10-CM

## 2017-08-01 DIAGNOSIS — T84.498D FAILED ORTHOPEDIC IMPLANT, SUBSEQUENT ENCOUNTER: Primary | ICD-10-CM

## 2017-08-01 PROBLEM — T84.498A FAILED ORTHOPEDIC IMPLANT: Status: ACTIVE | Noted: 2017-08-01

## 2017-08-01 PROCEDURE — 99213 OFFICE O/P EST LOW 20 MIN: CPT | Mod: GW,S$PBB,, | Performed by: ORTHOPAEDIC SURGERY

## 2017-08-01 PROCEDURE — 73552 X-RAY EXAM OF FEMUR 2/>: CPT | Mod: 26,GW,LT, | Performed by: RADIOLOGY

## 2017-08-01 PROCEDURE — 99213 OFFICE O/P EST LOW 20 MIN: CPT | Mod: PBBFAC,25,PO | Performed by: ORTHOPAEDIC SURGERY

## 2017-08-01 PROCEDURE — 99999 PR PBB SHADOW E&M-EST. PATIENT-LVL III: CPT | Mod: PBBFAC,,, | Performed by: ORTHOPAEDIC SURGERY

## 2017-08-01 PROCEDURE — 1159F MED LIST DOCD IN RCRD: CPT | Mod: ,,, | Performed by: ORTHOPAEDIC SURGERY

## 2017-08-01 NOTE — PROGRESS NOTES
Subjective:          Chief Complaint: Antonia Case is a 76 y.o. female who had concerns including Pain and Post-op Evaluation of the Left Thigh.    Ms. Knight is here for f//u after her left femur surgery. She fell previously and sustained a left IT fracture of the hip which was treated with a femoral nailing. She was doing well until she subsequently fell and fracture her femoral shaft at the end of the previously placed IM nail. The previous nail was removed and the femur was then treated with a  Long IM CM nail. She was doing well and progressing with ambulation however she has failure of her distal hardware and is here for f/u. She is continuing to increase her activities. And ambulating today without assistance. The left shoe lift has been added to the shoe. She has been in good spirits.    She is non-verbal and has a diagnosis of advanced dementia. She requires 24 hr monitoring in order to protect herself from injury per her . Her  does not feel she is in any pain at this time.      Pain   Pertinent negatives include no chills or fever.         Past Medical History:   Diagnosis Date    Alzheimer disease     Thyroid disease        Past Surgical History:   Procedure Laterality Date    KNEE SURGERY         History reviewed. No pertinent family history.      Current Outpatient Prescriptions:     ,d,-limonene flavor, bulk, 100 % Liqd, Take 1 capsule by mouth once daily., Disp: , Rfl:     alprazolam (XANAX) 0.5 MG tablet, Take 0.25 mg by mouth 2 (two) times daily. 0.25mg  With breakfast and lunch routinely and PRN for combative/frantic behavior., Disp: , Rfl:     ASCORBATE CALCIUM (VITAMIN C ORAL), Take by mouth., Disp: , Rfl:     aspirin 325 MG tablet, Take 1 tablet (325 mg total) by mouth 2 (two) times daily. For 30 days, then decrease back to 81 mg PO daily., Disp: , Rfl: 0    CALCIUM CARBONATE/VITAMIN D3 (VITAMIN D-3 ORAL), Take by mouth., Disp: , Rfl:     docusate sodium (COLACE) 100 MG  capsule, Take 1 capsule (100 mg total) by mouth 2 (two) times daily as needed for Constipation., Disp: , Rfl: 0    escitalopram oxalate (LEXAPRO) 10 MG tablet, Take 10 mg by mouth once daily., Disp: , Rfl:     ferrous gluconate (FERGON) 324 MG tablet, Take 1 tablet (324 mg total) by mouth daily with breakfast., Disp: , Rfl:     flaxseed oil 1,000 mg Cap, Take 1,000 mg by mouth once daily., Disp: , Rfl:     galantamine (RAZADYNE) 4 MG Tab, Take 4 mg by mouth 2 (two) times daily., Disp: , Rfl:     hydrocodone-acetaminophen 5-325mg (NORCO) 5-325 mg per tablet, Take 1 tablet by mouth every 6 (six) hours as needed., Disp: 20 tablet, Rfl: 0    levothyroxine (SYNTHROID) 75 MCG tablet, Take 75 mcg by mouth. M, W, F, Disp: , Rfl:     multivitamin (THERAGRAN) per tablet, Take 1 tablet by mouth once daily., Disp: , Rfl:     ondansetron (ZOFRAN-ODT) 4 MG TbDL, Take 1 tablet (4 mg total) by mouth every 8 (eight) hours as needed., Disp: 20 tablet, Rfl: 0    quetiapine (SEROQUEL) 25 MG Tab, Take 25 mg by mouth nightly as needed. , Disp: , Rfl:     turmeric root extract 500 mg Cap, Take 500 mg by mouth once daily., Disp: , Rfl:     TURMERIC, BULK, MISC, 1 tablet by Misc.(Non-Drug; Combo Route) route once daily. Mix in cereal @@ breakfast, Disp: , Rfl:     VITAMIN B COMPLEX ORAL, Take 1 tablet by mouth once daily., Disp: , Rfl:     Review of patient's allergies indicates:  No Known Allergies    Vitals:    08/01/17 1002   BP: 115/67   Pulse: 68       Review of Systems   Constitution: Negative for chills and fever.   Musculoskeletal: Positive for arthritis and falls.   Psychiatric/Behavioral: Positive for altered mental status.                   Objective:        General: Antonia is well-developed, well-nourished, appears stated age, in no acute distress, alert and oriented to time, place and person.     General    Vitals reviewed.  Constitutional: She appears well-developed and well-nourished. No distress.   HENT:   Head:  Normocephalic and atraumatic.   Nose: Nose normal.   Eyes: Pupils are equal, round, and reactive to light.   Cardiovascular: Normal rate.    Pulmonary/Chest: Effort normal.   Neurological: She is alert.   Psychiatric: She has a normal mood and affect. Her behavior is normal. Judgment and thought content normal.     General Musculoskeletal Exam   Gait: antalgic       Right Knee Exam   Right knee exam is normal.    Left Knee Exam   Left knee exam is normal.    Range of Motion   The patient has normal left knee ROM.    Comments:  She rises and sits multiple times during the visit. She does not appear to be in pain, however it is difficult to determine.    Equal leg lengths noted with shoe lift in placeLeft Hip Exam     Inspection   Scars: present  Swelling: absent  Bruising: absent    Comments:  Incisions are healed. No appreciable pain demonstrated with palpation of the left hip/femur or distal femur          Vascular Exam       Left Pulses  Dorsalis Pedis:      2+  Posterior Tibial:      2+        Edema  Left Lower Leg: absent        Current and previous radiographic studies and results were reviewed with the patient:   Callus formation has increased at mid-shaft fracture site  No apparent change in hardware positioning at the distal femur        Assessment:       Encounter Diagnosis   Name Primary?    S/P ORIF (open reduction internal fixation) fracture Yes          Plan:         Discussed x-ray findings with the patient's  and at this time the fracture and hardware appear to be stable.   We will continue to monitor the patient's activity level and her xrays for now.  F/U in six weeks or sooner if needed

## 2017-08-04 ENCOUNTER — OFFICE VISIT (OUTPATIENT)
Dept: PODIATRY | Facility: CLINIC | Age: 77
End: 2017-08-04
Payer: MEDICARE

## 2017-08-04 VITALS — WEIGHT: 97.25 LBS | BODY MASS INDEX: 17.23 KG/M2 | HEIGHT: 63 IN

## 2017-08-04 DIAGNOSIS — B35.1 ONYCHOMYCOSIS DUE TO DERMATOPHYTE: Primary | ICD-10-CM

## 2017-08-04 PROCEDURE — 99212 OFFICE O/P EST SF 10 MIN: CPT | Mod: PBBFAC,PO

## 2017-08-04 PROCEDURE — 99214 OFFICE O/P EST MOD 30 MIN: CPT | Mod: GW,S$PBB,,

## 2017-08-04 PROCEDURE — 1126F AMNT PAIN NOTED NONE PRSNT: CPT | Mod: ,,,

## 2017-08-04 PROCEDURE — 1159F MED LIST DOCD IN RCRD: CPT | Mod: ,,,

## 2017-08-04 PROCEDURE — 99999 PR PBB SHADOW E&M-EST. PATIENT-LVL II: CPT | Mod: PBBFAC,,,

## 2017-08-04 NOTE — PROGRESS NOTES
Subjective:       Patient ID: Antonia Case is a 76 y.o. female.    Chief Complaint: Nail Care; Other (Dr Graham  1/16/17); and Foot Problem (bad odor)    HPI  Antonia is a 76 y.o. female who presents to the clinic complaining of thick and discolored toenails on both feet. Antonia is inquiring about treatment options.  Also reporting foot odor.    Past Medical History:   Diagnosis Date    Alzheimer disease     Thyroid disease          Review of Systems  ROS:  Constitution: Negative for chills, fever, weakness and malaise/fatigue.   HEENT: Negative for headaches.   Cardiovascular: Negative for chest pain and claudication.   Respiratory: Negative for cough and shortness of breath.   Musculoskeletal: Positive for foot pain.  Negative for muscle cramps and muscle weakness.   Gastrointestinal: Negative for nausea and vomiting.   Neurological: Negative for numbness and paresthesias.   Dermatological: Negative for wound.        Objective:      Physical Exam  Constitutional:  Patient is oriented to person, place, and time. Vital signs are normal.  Appears well-developed and well-nourished.     Vascular:  Dorsalis pedis pulses are 2+ on the right side, and 2+ on the left side.   Posterior tibial pulses are 2+ on the right side, and 2+ on the left side.   + digital hair growth, capillary fill time to all toes <3 seconds, no swelling    Skin/Dermatological:  Skin is warm and intact.  No cyanosis or clubbing.  No rashes noted.  No open wounds.  All ten toenails yellow discolored, thickened 2-4 mm to base with subungual debris.    Musculoskeletal:      Restricted range of motion of midtarsal, subtalar joints.  Forefoot to rearfoot well aligned.  + restriction of ankle joint dorsiflexion or plantarflexion.  No crepitus.        Neurological:  No deficits to sharp/dull, light touch or vibratory sensation.   Muscle strength to tibialis anterior, extensor hallucis longus, extensor digitorum longus, peroneal muscles, flexor hallucis/digotorum  longus, posterior tibial and gastrosoleal complex is 5/5, normal tone without assymmetry   Patellar reflexes are 2+ on the right side and 2+ on the left side.  Achilles reflexes are 2+ on the right side and 2+ on the left side.          Assessment:       1. Onychomycosis due to dermatophyte        Plan:       Onychomycosis due to dermatophyte  -     efinaconazole (JUBLIA) 10 % Joseph; Apply 1 application topically once daily.  Dispense: 8 mL; Refill: 11    Other orders  -     Cancel: foot deodorant comb. no.3 AerP; Apply 1 application topically once daily.  Dispense: 113 g; Refill: 11  -     efinaconazole (JUBLIA) 10 % Joseph; Apply 1 application topically once daily.  Dispense: 8 mL; Refill: 11      Apple cide vinegar soaks, vicks vapo rub 1-2 week.  Nails palliated to tolerance, rtc prn.

## 2017-08-29 ENCOUNTER — OFFICE VISIT (OUTPATIENT)
Dept: ORTHOPEDICS | Facility: CLINIC | Age: 77
End: 2017-08-29
Payer: MEDICARE

## 2017-08-29 ENCOUNTER — HOSPITAL ENCOUNTER (OUTPATIENT)
Dept: RADIOLOGY | Facility: HOSPITAL | Age: 77
Discharge: HOME | End: 2017-08-29
Attending: ORTHOPAEDIC SURGERY
Payer: MEDICARE

## 2017-08-29 VITALS
BODY MASS INDEX: 17.19 KG/M2 | WEIGHT: 97 LBS | SYSTOLIC BLOOD PRESSURE: 103 MMHG | DIASTOLIC BLOOD PRESSURE: 62 MMHG | HEIGHT: 63 IN | HEART RATE: 77 BPM

## 2017-08-29 DIAGNOSIS — Z98.890 S/P ORIF (OPEN REDUCTION INTERNAL FIXATION) FRACTURE: ICD-10-CM

## 2017-08-29 DIAGNOSIS — G89.18 POST-OP PAIN: ICD-10-CM

## 2017-08-29 DIAGNOSIS — T84.498D FAILED ORTHOPEDIC IMPLANT, SUBSEQUENT ENCOUNTER: Primary | ICD-10-CM

## 2017-08-29 DIAGNOSIS — G89.18 POST-OP PAIN: Primary | ICD-10-CM

## 2017-08-29 DIAGNOSIS — Z87.81 S/P ORIF (OPEN REDUCTION INTERNAL FIXATION) FRACTURE: ICD-10-CM

## 2017-08-29 PROCEDURE — 73560 X-RAY EXAM OF KNEE 1 OR 2: CPT | Mod: 26,LT,, | Performed by: RADIOLOGY

## 2017-08-29 PROCEDURE — 99213 OFFICE O/P EST LOW 20 MIN: CPT | Mod: PBBFAC,25,PO | Performed by: ORTHOPAEDIC SURGERY

## 2017-08-29 PROCEDURE — 73560 X-RAY EXAM OF KNEE 1 OR 2: CPT | Mod: TC,PO,LT

## 2017-08-29 PROCEDURE — 73552 X-RAY EXAM OF FEMUR 2/>: CPT | Mod: 26,LT,, | Performed by: RADIOLOGY

## 2017-08-29 PROCEDURE — 1159F MED LIST DOCD IN RCRD: CPT | Mod: ,,, | Performed by: ORTHOPAEDIC SURGERY

## 2017-08-29 PROCEDURE — 99999 PR PBB SHADOW E&M-EST. PATIENT-LVL III: CPT | Mod: PBBFAC,,, | Performed by: ORTHOPAEDIC SURGERY

## 2017-08-29 PROCEDURE — 99214 OFFICE O/P EST MOD 30 MIN: CPT | Mod: 57,S$PBB,, | Performed by: ORTHOPAEDIC SURGERY

## 2017-08-29 PROCEDURE — 73552 X-RAY EXAM OF FEMUR 2/>: CPT | Mod: TC,PO,LT

## 2017-08-29 RX ORDER — SODIUM CHLORIDE 9 MG/ML
INJECTION, SOLUTION INTRAVENOUS CONTINUOUS
Status: CANCELLED | OUTPATIENT
Start: 2017-08-29

## 2017-08-30 PROBLEM — G89.18 POST-OP PAIN: Status: ACTIVE | Noted: 2017-08-30

## 2017-08-30 NOTE — PROGRESS NOTES
Subjective:          Chief Complaint: Antonia Case is a 76 y.o. female who had concerns including Pain of the Left Knee.    Ms. Knight is here for f//u after her left femur surgery. She fell previously and sustained a left IT fracture of the hip which was treated with a femoral nailing. She was doing well until she subsequently fell and fracture her femoral shaft at the end of the previously placed IM nail. The previous nail was removed and the femur was then treated with a  Long IM CM nail. She was doing well and progressing with ambulation however she has failure of her distal hardware and is here for f/u. She was continuing to increase her activities. And ambulating without assistance. The left shoe lift has been added to the shoe. However, The patient is here today secondary to not walking and having knee pain that is new.    She is non-verbal and has a diagnosis of advanced dementia. She requires 24 hr monitoring in order to protect herself from injury per her .         Pain   Pertinent negatives include no chills or fever.         Past Medical History:   Diagnosis Date    Alzheimer disease     Thyroid disease        Past Surgical History:   Procedure Laterality Date    FEMUR SURGERY Left 04/23/2017    HIP FRACTURE SURGERY Left 03/13/2017    KNEE SURGERY         History reviewed. No pertinent family history.    No current facility-administered medications for this visit.   No current outpatient prescriptions on file.    Facility-Administered Medications Ordered in Other Visits:     0.9%  NaCl infusion, , Intravenous, Continuous, Jorge Stevenson MD    ceFAZolin injection 2 g, 2 g, Intravenous, On Call Procedure, Jorge Stevenson MD    chlorhexidine 0.12 % solution 10 mL, 10 mL, Mouth/Throat, BID, Jorge Stevenson MD, 10 mL at 08/30/17 1411    lactated ringers infusion, , Intravenous, Continuous, Jorge Stevenson MD, Last Rate: 100 mL/hr at 08/30/17 1410    midazolam (PF) (VERSED) 2 mg/2 mL (1  mg/mL) injection, , , ,     morphine injection 2 mg, 2 mg, Intravenous, Q15 Min PRN, Evangelist Escamilla MD, 2 mg at 08/30/17 1456    mupirocin 2 % ointment, , Nasal, BID, Jorge Stevenson MD    Review of patient's allergies indicates:  No Known Allergies    Vitals:    08/29/17 1013   BP: 103/62   Pulse: 77       Review of Systems   Constitution: Negative for chills and fever.   Musculoskeletal: Positive for arthritis and falls.   Psychiatric/Behavioral: Positive for altered mental status.                   Objective:        General: Antonia is well-developed, well-nourished, appears stated age, in no acute distress, alert and oriented to time, place and person.     General    Vitals reviewed.  Constitutional: She appears well-developed and well-nourished. No distress.   HENT:   Head: Normocephalic and atraumatic.   Nose: Nose normal.   Eyes: Pupils are equal, round, and reactive to light.   Cardiovascular: Normal rate.    Pulmonary/Chest: Effort normal.   Neurological: She is alert.   Psychiatric: She has a normal mood and affect. Her behavior is normal. Judgment and thought content normal.     General Musculoskeletal Exam   Gait: antalgic       Right Knee Exam   Right knee exam is normal.    Left Knee Exam   Left knee exam is normal.    Range of Motion   The patient has normal left knee ROM.    Comments:  She rises and sits multiple times during the visit. She does not appear to be in pain, however it is difficult to determine.    Equal leg lengths noted with shoe lift in placeLeft Hip Exam     Inspection   Scars: present  Swelling: absent  Bruising: absent    Comments:  Incisions are healed. No appreciable pain demonstrated with palpation of the left hip/femur or distal femur          Vascular Exam       Left Pulses  Dorsalis Pedis:      2+  Posterior Tibial:      2+        Edema  Left Lower Leg: absent        Current and previous radiographic studies and results were reviewed with the patient:   2 views of the  knee demonstrate moderate degenerative changes about the knee.  There is an intramedullary florencia whose distal portion has migrated inferiorly when compared to be 04/23/2017 examination but is not significantly changed in positioning relative to 05/30/2017.  There is an apparent fracture of the screw with the distal portion remaining in the distal florencia.        Assessment:       Encounter Diagnoses   Name Primary?    Post-op pain     S/P ORIF (open reduction internal fixation) fracture     Failed orthopedic implant, subsequent encounter Yes          Plan:         We reviewed with Antonia, her  and family members today, the pathology and natural history of her diagnosis. We have discussed a variety of treatment options including medications, physical therapy and other alternative treatments. I also explained the indications, risks and benefits of surgery. After discussion, Antonia decided to proceed with surgery. The decision was made to go forward with     Left femoral TFN removal with revision of ORIF of femur fracture    The details of the surgical procedure were explained, including the location of probable incisions and a description of likely hardware and/or grafts to be used.  The patient understands the likely convalescence after surgery.  Also, we have thoroughly discussed the risks, benefits and alternatives to surgery, including, but not limited to, the risk of infection, joint stiffness, blood clot (including DVT and/or pulmonary embolus), neurologic and vascular injury.  It was explained that, if tissue has been repaired or reconstructed, there is a chance of failure, which may require further management.      All of the patient's questions were answered and informed consent was obtained. The patient will contact us if they have any questions or concerns in the interim.

## 2017-08-30 NOTE — H&P
Subjective:          Chief Complaint: Anotnia Case is a 76 y.o. female who had concerns including Pain of the Left Knee.    Ms. Knight is here for f//u after her left femur surgery. She fell previously and sustained a left IT fracture of the hip which was treated with a femoral nailing. She was doing well until she subsequently fell and fracture her femoral shaft at the end of the previously placed IM nail. The previous nail was removed and the femur was then treated with a  Long IM CM nail. She was doing well and progressing with ambulation however she has failure of her distal hardware and is here for f/u. She was continuing to increase her activities. And ambulating without assistance. The left shoe lift has been added to the shoe. However, The patient is here today secondary to not walking and having knee pain that is new.    She is non-verbal and has a diagnosis of advanced dementia. She requires 24 hr monitoring in order to protect herself from injury per her .         Pain   Pertinent negatives include no chills or fever.         Past Medical History:   Diagnosis Date    Alzheimer disease     Thyroid disease        Past Surgical History:   Procedure Laterality Date    FEMUR SURGERY Left 04/23/2017    HIP FRACTURE SURGERY Left 03/13/2017    KNEE SURGERY         History reviewed. No pertinent family history.    No current facility-administered medications for this visit.   No current outpatient prescriptions on file.    Facility-Administered Medications Ordered in Other Visits:     0.9%  NaCl infusion, , Intravenous, Continuous, Jorge Stevenson MD    ceFAZolin injection 2 g, 2 g, Intravenous, On Call Procedure, Jorge Stevenson MD    chlorhexidine 0.12 % solution 10 mL, 10 mL, Mouth/Throat, BID, Jorge Stevenson MD, 10 mL at 08/30/17 1411    lactated ringers infusion, , Intravenous, Continuous, Jorge Stevenson MD, Last Rate: 100 mL/hr at 08/30/17 1410    midazolam (PF) (VERSED) 2 mg/2 mL (1  mg/mL) injection, , , ,     morphine injection 2 mg, 2 mg, Intravenous, Q15 Min PRN, Evangelist Escamilla MD, 2 mg at 08/30/17 1456    mupirocin 2 % ointment, , Nasal, BID, Jorge Stevenson MD    Review of patient's allergies indicates:  No Known Allergies    Vitals:    08/29/17 1013   BP: 103/62   Pulse: 77       Review of Systems   Constitution: Negative for chills and fever.   Musculoskeletal: Positive for arthritis and falls.   Psychiatric/Behavioral: Positive for altered mental status.                   Objective:        General: Antonia is well-developed, well-nourished, appears stated age, in no acute distress, alert and oriented to time, place and person.     General    Vitals reviewed.  Constitutional: She appears well-developed and well-nourished. No distress.   HENT:   Head: Normocephalic and atraumatic.   Nose: Nose normal.   Eyes: Pupils are equal, round, and reactive to light.   Cardiovascular: Normal rate.    Pulmonary/Chest: Effort normal.   Neurological: She is alert.   Psychiatric: She has a normal mood and affect. Her behavior is normal. Judgment and thought content normal.     General Musculoskeletal Exam   Gait: antalgic       Right Knee Exam   Right knee exam is normal.    Left Knee Exam   Left knee exam is normal.    Range of Motion   The patient has normal left knee ROM.    Comments:  She rises and sits multiple times during the visit. She does not appear to be in pain, however it is difficult to determine.    Equal leg lengths noted with shoe lift in placeLeft Hip Exam     Inspection   Scars: present  Swelling: absent  Bruising: absent    Comments:  Incisions are healed. No appreciable pain demonstrated with palpation of the left hip/femur or distal femur          Vascular Exam       Left Pulses  Dorsalis Pedis:      2+  Posterior Tibial:      2+        Edema  Left Lower Leg: absent        Current and previous radiographic studies and results were reviewed with the patient:   2 views of the  knee demonstrate moderate degenerative changes about the knee.  There is an intramedullary florencia whose distal portion has migrated inferiorly when compared to be 04/23/2017 examination but is not significantly changed in positioning relative to 05/30/2017.  There is an apparent fracture of the screw with the distal portion remaining in the distal florencia.        Assessment:       Encounter Diagnoses   Name Primary?    Post-op pain     S/P ORIF (open reduction internal fixation) fracture     Failed orthopedic implant, subsequent encounter Yes          Plan:         We reviewed with Antonia, her  and family members today, the pathology and natural history of her diagnosis. We have discussed a variety of treatment options including medications, physical therapy and other alternative treatments. I also explained the indications, risks and benefits of surgery. After discussion, Antonia decided to proceed with surgery. The decision was made to go forward with     Left femoral TFN removal with revision of ORIF of femur fracture    The details of the surgical procedure were explained, including the location of probable incisions and a description of likely hardware and/or grafts to be used.  The patient understands the likely convalescence after surgery.  Also, we have thoroughly discussed the risks, benefits and alternatives to surgery, including, but not limited to, the risk of infection, joint stiffness, blood clot (including DVT and/or pulmonary embolus), neurologic and vascular injury.  It was explained that, if tissue has been repaired or reconstructed, there is a chance of failure, which may require further management.      All of the patient's questions were answered and informed consent was obtained. The patient will contact us if they have any questions or concerns in the interim.

## 2017-09-01 PROBLEM — Z98.890 S/P ORIF (OPEN REDUCTION INTERNAL FIXATION) FRACTURE: Status: ACTIVE | Noted: 2017-09-01

## 2017-09-01 PROBLEM — Z87.81 S/P ORIF (OPEN REDUCTION INTERNAL FIXATION) FRACTURE: Status: ACTIVE | Noted: 2017-09-01

## 2017-09-05 ENCOUNTER — TELEPHONE (OUTPATIENT)
Dept: ORTHOPEDICS | Facility: CLINIC | Age: 77
End: 2017-09-05

## 2017-09-05 NOTE — TELEPHONE ENCOUNTER
Spoke with  at [facility].  She is checking with [facility] to make sure everything is going as ordered.  She is contacting the patient's  as well

## 2017-09-05 NOTE — TELEPHONE ENCOUNTER
----- Message from Anastasiia Benton sent at 9/5/2017  8:58 AM CDT -----  Please call   Richard / [phone] home health is confused over the orders / please call to straighten out

## 2017-09-12 ENCOUNTER — OFFICE VISIT (OUTPATIENT)
Dept: ORTHOPEDICS | Facility: CLINIC | Age: 77
End: 2017-09-12
Payer: MEDICARE

## 2017-09-12 ENCOUNTER — HOSPITAL ENCOUNTER (OUTPATIENT)
Dept: RADIOLOGY | Facility: HOSPITAL | Age: 77
Discharge: HOME | End: 2017-09-12
Attending: ORTHOPAEDIC SURGERY
Payer: MEDICARE

## 2017-09-12 VITALS
DIASTOLIC BLOOD PRESSURE: 62 MMHG | BODY MASS INDEX: 18.43 KG/M2 | HEART RATE: 79 BPM | SYSTOLIC BLOOD PRESSURE: 127 MMHG | WEIGHT: 104 LBS | HEIGHT: 63 IN

## 2017-09-12 DIAGNOSIS — G89.18 POST-OP PAIN: Primary | ICD-10-CM

## 2017-09-12 DIAGNOSIS — Z98.890 S/P ORIF (OPEN REDUCTION INTERNAL FIXATION) FRACTURE: ICD-10-CM

## 2017-09-12 DIAGNOSIS — Z87.81 S/P ORIF (OPEN REDUCTION INTERNAL FIXATION) FRACTURE: ICD-10-CM

## 2017-09-12 DIAGNOSIS — S72.22XP: ICD-10-CM

## 2017-09-12 DIAGNOSIS — G89.18 POST-OP PAIN: ICD-10-CM

## 2017-09-12 PROCEDURE — 99213 OFFICE O/P EST LOW 20 MIN: CPT | Mod: PBBFAC,25,PO | Performed by: ORTHOPAEDIC SURGERY

## 2017-09-12 PROCEDURE — 99999 PR PBB SHADOW E&M-EST. PATIENT-LVL III: CPT | Mod: PBBFAC,,, | Performed by: ORTHOPAEDIC SURGERY

## 2017-09-12 PROCEDURE — 99024 POSTOP FOLLOW-UP VISIT: CPT | Mod: ,,, | Performed by: ORTHOPAEDIC SURGERY

## 2017-09-12 PROCEDURE — 73552 X-RAY EXAM OF FEMUR 2/>: CPT | Mod: 26,LT,, | Performed by: RADIOLOGY

## 2017-09-12 PROCEDURE — 73552 X-RAY EXAM OF FEMUR 2/>: CPT | Mod: TC,PO,LT

## 2017-09-12 RX ORDER — HYDROCODONE BITARTRATE AND ACETAMINOPHEN 5; 325 MG/1; MG/1
1 TABLET ORAL EVERY 8 HOURS PRN
Qty: 60 TABLET | Refills: 0 | Status: SHIPPED | OUTPATIENT
Start: 2017-09-12 | End: 2017-09-26 | Stop reason: SDUPTHER

## 2017-09-12 NOTE — PROGRESS NOTES
Subjective:          Chief Complaint: Antonia Case is a 76 y.o. female who had concerns including Pain and Post-op Evaluation of the Left Femur.    Per her family and caregiver, Mrs. Case has been doing well. There were some early issues that needed to be clarified with home health/PT regarding activity restrictions and WB restrictions. She is currently not on hospice secondary to having home health. She has managed to be able to remain in her brace and not put any weight on the left lower extremity with the braced locked in flexion while she's sitting in the wheelchair. She seems to be having a good day today also.    DATE: 8/30/2017     PREOPERATIVE DIAGNOSIS:  Left femoral shaft fracture delayed union with failed orthopaedic hardware.     POSTOPERATIVE DIAGNOSIS:  Left femoral shaft fracture delayed union with failed orthopaedic hardware.     PROCEDURE:  Left femoral nail removal from previous IT fracture and open revision ORIF of femoral shaft fracture     SURGEON:  LUCIUS ZELAYA M.D.     ASSISTANT:   SANDRA Baer.     ANESTHESIA:  General and Local.     ESTIMATED BLOOD LOSS:  1200 mL     IVF: 2200mL     URINE OUTPUT: 200mL     EXPLANTS: 11mm x 380mm TFN; 105mm helical tripp; 48mm x 5.0mm distal cortical screw  IMPLANTS: 14-hole large fragment LCP plate; 2-1.7mm cables; proximal cortical screws: 2-4.5x 36mm; 2-4.5x38mm AND 1-5.0x38mm locking screw; distal cortical screws: 2-4.1pdi21ma; 1-4.5mm.x42mm; 1-4.2rch18zl; AND 1-5.3rab99pj locking screw; 1-5.0x46mm locking screw      Pain   Associated symptoms include myalgias.       Past Medical History:   Diagnosis Date    Alzheimer disease     Thyroid disease        Past Surgical History:   Procedure Laterality Date    FEMUR SURGERY Left 04/23/2017    HIP FRACTURE SURGERY Left 03/13/2017    KNEE SURGERY         History reviewed. No pertinent family history.      Current Outpatient Prescriptions:     alprazolam (XANAX) 0.5 MG tablet, Take 0.25 mg by  mouth 2 (two) times daily. 0.25mg  With breakfast and lunch routinely and PRN for combative/frantic behavior., Disp: , Rfl:     aspirin 325 MG tablet, Take 1 tablet (325 mg total) by mouth once daily. For 30 days, then decrease back to 81 mg PO daily., Disp: , Rfl: 0    CALCIUM CARBONATE/VITAMIN D3 (VITAMIN D-3 ORAL), Take by mouth., Disp: , Rfl:     escitalopram oxalate (LEXAPRO) 10 MG tablet, Take 10 mg by mouth once daily., Disp: , Rfl:     flaxseed oil 1,000 mg Cap, Take 1,000 mg by mouth once daily., Disp: , Rfl:     levothyroxine (SYNTHROID) 75 MCG tablet, Take 75 mcg by mouth. M, W, F, Disp: , Rfl:     multivitamin (THERAGRAN) per tablet, Take 1 tablet by mouth once daily., Disp: , Rfl:     quetiapine (SEROQUEL) 25 MG Tab, Take 25 mg by mouth nightly as needed. , Disp: , Rfl:     turmeric root extract 500 mg Cap, Take 500 mg by mouth once daily., Disp: , Rfl:     hydrocodone-acetaminophen 5-325mg (NORCO) 5-325 mg per tablet, Take 1 tablet by mouth every 8 (eight) hours as needed., Disp: 60 tablet, Rfl: 0    Review of patient's allergies indicates:  No Known Allergies    Vitals:    09/12/17 1046   BP: 127/62   Pulse: 79   '    Review of Systems   Musculoskeletal: Positive for muscle weakness, myalgias and stiffness.   All other systems reviewed and are negative.                  Objective:        General: Antonia is well-developed, well-nourished, appears stated age, in no acute distress, alert and oriented to time, place and person.     General    Vitals reviewed.  Constitutional: She appears well-developed and well-nourished. No distress.   HENT:   Head: Normocephalic and atraumatic.   Nose: Nose normal.   Cardiovascular: Normal rate.    Pulmonary/Chest: Effort normal.   Neurological: She is alert.             Left Knee Exam     Inspection   Swelling: present  Effusion: present  Deformity: deformity  Bruising: absent    Range of Motion   Extension: abnormal     Comments:  Staples removed from lateral  thigh and steri-strips placed; no signs of infection; swelling has resolved; atrophy noted from disuse; decreased hip and knee ROM as to be expected at this time.    + LLD with left being shorter than right as to be expected as well; discrepancy is smaller than pre-op    Vascular Exam       Edema  Left Lower Leg: absent      Current and previous radiographic studies and results were reviewed with the patient:     The bones are osteopenic.  There has been interval postoperative change of the left femur compatible with removal of the patient's intramedullary florencia and placement of a new lateral screw plate construct and cerclage wires which encircle the mid left femoral shaft at the site of the patient's previously noted oblique periprosthetic left femoral mid shaft fracture.  There is mild apex medial angulation of the fracture apex.  No hardware complication.  There has been interval progression of healing when compared to the previous study.  There is degenerative change of the left knee joint resulting in paratracheal or osteophyte formation with mild left knee medial tibiofemoral joint space narrowing appreciated.  There is air present within the left suprapatellar recess compatible with recent surgery.  There is possible small left knee joint effusion.  There is degenerative enthesophyte formation at the attachment of the left gluteus tendon on the left greater trochanter.      Assessment:       Encounter Diagnoses   Name Primary?    Post-op pain Yes    S/P ORIF (open reduction internal fixation) fracture     Closed displaced subtrochanteric fracture of left femur with malunion, subsequent encounter           Plan:         Staples removed  Continue with restrictions of NWB on left lower extremity for 6 weeks total  Continue with home PT for ROM of bilateral lowers with ROM and strengthening for bilateral uppers  F/U in 2 weeks

## 2017-09-17 PROBLEM — T84.498A FAILED ORTHOPEDIC IMPLANT: Status: RESOLVED | Noted: 2017-08-01 | Resolved: 2017-09-17

## 2017-09-17 PROBLEM — S72.142A FRACTURE, INTERTROCHANTERIC, LEFT FEMUR: Status: RESOLVED | Noted: 2017-03-14 | Resolved: 2017-09-17

## 2017-09-26 ENCOUNTER — OFFICE VISIT (OUTPATIENT)
Dept: ORTHOPEDICS | Facility: CLINIC | Age: 77
End: 2017-09-26
Payer: MEDICARE

## 2017-09-26 VITALS
HEIGHT: 63 IN | WEIGHT: 104 LBS | DIASTOLIC BLOOD PRESSURE: 69 MMHG | BODY MASS INDEX: 18.43 KG/M2 | SYSTOLIC BLOOD PRESSURE: 125 MMHG | HEART RATE: 73 BPM

## 2017-09-26 DIAGNOSIS — G89.18 POST-OP PAIN: ICD-10-CM

## 2017-09-26 DIAGNOSIS — Z98.890 S/P ORIF (OPEN REDUCTION INTERNAL FIXATION) FRACTURE: Primary | ICD-10-CM

## 2017-09-26 DIAGNOSIS — Z87.81 S/P ORIF (OPEN REDUCTION INTERNAL FIXATION) FRACTURE: Primary | ICD-10-CM

## 2017-09-26 PROCEDURE — 99999 PR PBB SHADOW E&M-EST. PATIENT-LVL III: CPT | Mod: PBBFAC,,, | Performed by: ORTHOPAEDIC SURGERY

## 2017-09-26 PROCEDURE — 99024 POSTOP FOLLOW-UP VISIT: CPT | Mod: ,,, | Performed by: ORTHOPAEDIC SURGERY

## 2017-09-26 PROCEDURE — 99213 OFFICE O/P EST LOW 20 MIN: CPT | Mod: PBBFAC,PO | Performed by: ORTHOPAEDIC SURGERY

## 2017-09-26 RX ORDER — HYDROCODONE BITARTRATE AND ACETAMINOPHEN 5; 325 MG/1; MG/1
TABLET ORAL
Qty: 60 TABLET | Refills: 0 | Status: ON HOLD | OUTPATIENT
Start: 2017-09-26 | End: 2019-10-05 | Stop reason: SDUPTHER

## 2017-09-26 NOTE — PROGRESS NOTES
Subjective:          Chief Complaint: Antonia Case is a 76 y.o. female who had concerns including Post-op Evaluation and Pain of the Left Femur.    Per her family and caregiver, Mrs. Case has been doing well. The family would like to lift the PT orders and return to hospice and home health can transition her back to hospice    DATE: 8/30/2017     PREOPERATIVE DIAGNOSIS:  Left femoral shaft fracture delayed union with failed orthopaedic hardware.     POSTOPERATIVE DIAGNOSIS:  Left femoral shaft fracture delayed union with failed orthopaedic hardware.     PROCEDURE:  Left femoral nail removal from previous IT fracture and open revision ORIF of femoral shaft fracture     SURGEON:  LUCIUS ZELAYA M.D.     ASSISTANT:   SANDRA Baer.     ANESTHESIA:  General and Local.     ESTIMATED BLOOD LOSS:  1200 mL     IVF: 2200mL     URINE OUTPUT: 200mL     EXPLANTS: 11mm x 380mm TFN; 105mm helical tripp; 48mm x 5.0mm distal cortical screw  IMPLANTS: 14-hole large fragment LCP plate; 2-1.7mm cables; proximal cortical screws: 2-4.5x 36mm; 2-4.5x38mm AND 1-5.0x38mm locking screw; distal cortical screws: 2-4.7ugh42gq; 1-4.5mm.x42mm; 1-4.2iqt34qs; AND 1-5.0isr93nm locking screw; 1-5.0x46mm locking screw      Pain   Associated symptoms include myalgias.       Past Medical History:   Diagnosis Date    Alzheimer disease     Thyroid disease        Past Surgical History:   Procedure Laterality Date    FEMUR SURGERY Left 04/23/2017    HIP FRACTURE SURGERY Left 03/13/2017    KNEE SURGERY         History reviewed. No pertinent family history.      Current Outpatient Prescriptions:     alprazolam (XANAX) 0.5 MG tablet, Take 0.25 mg by mouth 2 (two) times daily. 0.25mg  With breakfast and lunch routinely and PRN for combative/frantic behavior., Disp: , Rfl:     aspirin 325 MG tablet, Take 1 tablet (325 mg total) by mouth once daily. For 30 days, then decrease back to 81 mg PO daily., Disp: , Rfl: 0    CALCIUM CARBONATE/VITAMIN D3  (VITAMIN D-3 ORAL), Take by mouth., Disp: , Rfl:     escitalopram oxalate (LEXAPRO) 10 MG tablet, Take 10 mg by mouth once daily., Disp: , Rfl:     flaxseed oil 1,000 mg Cap, Take 1,000 mg by mouth once daily., Disp: , Rfl:     hydrocodone-acetaminophen 5-325mg (NORCO) 5-325 mg per tablet, Take 1 tablet by mouth every 8 (eight) hours as needed., Disp: 60 tablet, Rfl: 0    levothyroxine (SYNTHROID) 75 MCG tablet, Take 75 mcg by mouth. M, W, F, Disp: , Rfl:     multivitamin (THERAGRAN) per tablet, Take 1 tablet by mouth once daily., Disp: , Rfl:     quetiapine (SEROQUEL) 25 MG Tab, Take 25 mg by mouth nightly as needed. , Disp: , Rfl:     turmeric root extract 500 mg Cap, Take 500 mg by mouth once daily., Disp: , Rfl:     Review of patient's allergies indicates:  No Known Allergies    Vitals:    09/26/17 1021   BP: 125/69   Pulse: 73   '    Review of Systems   Musculoskeletal: Positive for muscle weakness, myalgias and stiffness.   All other systems reviewed and are negative.                  Objective:        General: Antonia is well-developed, well-nourished, appears stated age, in no acute distress, alert and oriented to time, place and person.     General    Vitals reviewed.  Constitutional: She appears well-developed and well-nourished. No distress.   HENT:   Head: Normocephalic and atraumatic.   Nose: Nose normal.   Cardiovascular: Normal rate.    Pulmonary/Chest: Effort normal.   Neurological: She is alert.             Left Knee Exam     Inspection   Swelling: present  Effusion: absent  Deformity: deformity  Bruising: absent    Range of Motion   Extension: abnormal     Comments:  No signs of infection; swelling has resolved; atrophy noted from disuse; decreased hip and knee ROM as to be expected at this time but improving    + LLD with left being shorter than right as to be expected as well; discrepancy is smaller than pre-op    Vicryl suture visible but no signs of infection    Vascular Exam        Edema  Left Lower Leg: absent      previous radiographic studies and results were reviewed with the patient:     The bones are osteopenic.  There has been interval postoperative change of the left femur compatible with removal of the patient's intramedullary florencia and placement of a new lateral screw plate construct and cerclage wires which encircle the mid left femoral shaft at the site of the patient's previously noted oblique periprosthetic left femoral mid shaft fracture.  There is mild apex medial angulation of the fracture apex.  No hardware complication.  There has been interval progression of healing when compared to the previous study.  There is degenerative change of the left knee joint resulting in paratracheal or osteophyte formation with mild left knee medial tibiofemoral joint space narrowing appreciated.  There is air present within the left suprapatellar recess compatible with recent surgery.  There is possible small left knee joint effusion.  There is degenerative enthesophyte formation at the attachment of the left gluteus tendon on the left greater trochanter.      Assessment:       Encounter Diagnoses   Name Primary?    Post-op pain     S/P ORIF (open reduction internal fixation) fracture Yes          Plan:         Continue with restrictions of NWB on left lower extremity for 6 weeks total (2 more weeks)  Can discontinue home PT will write order; return to hospice care  F/U in 2 weeks with x-rays of the left femur

## 2017-10-09 DIAGNOSIS — Z87.81 S/P ORIF (OPEN REDUCTION INTERNAL FIXATION) FRACTURE: Primary | ICD-10-CM

## 2017-10-09 DIAGNOSIS — Z98.890 S/P ORIF (OPEN REDUCTION INTERNAL FIXATION) FRACTURE: Primary | ICD-10-CM

## 2017-10-10 ENCOUNTER — OFFICE VISIT (OUTPATIENT)
Dept: ORTHOPEDICS | Facility: CLINIC | Age: 77
End: 2017-10-10
Payer: MEDICARE

## 2017-10-10 ENCOUNTER — HOSPITAL ENCOUNTER (OUTPATIENT)
Dept: RADIOLOGY | Facility: HOSPITAL | Age: 77
Discharge: HOME | End: 2017-10-10
Attending: ORTHOPAEDIC SURGERY
Payer: MEDICARE

## 2017-10-10 VITALS
HEART RATE: 66 BPM | BODY MASS INDEX: 18.44 KG/M2 | SYSTOLIC BLOOD PRESSURE: 126 MMHG | HEIGHT: 63 IN | DIASTOLIC BLOOD PRESSURE: 60 MMHG | WEIGHT: 104.06 LBS

## 2017-10-10 DIAGNOSIS — M89.8X5 PAIN OF LEFT FEMUR: ICD-10-CM

## 2017-10-10 DIAGNOSIS — G89.18 POST-OP PAIN: ICD-10-CM

## 2017-10-10 DIAGNOSIS — Z87.81 S/P ORIF (OPEN REDUCTION INTERNAL FIXATION) FRACTURE: ICD-10-CM

## 2017-10-10 DIAGNOSIS — Z87.81 S/P ORIF (OPEN REDUCTION INTERNAL FIXATION) FRACTURE: Primary | ICD-10-CM

## 2017-10-10 DIAGNOSIS — Z98.890 S/P ORIF (OPEN REDUCTION INTERNAL FIXATION) FRACTURE: Primary | ICD-10-CM

## 2017-10-10 DIAGNOSIS — Z98.890 S/P ORIF (OPEN REDUCTION INTERNAL FIXATION) FRACTURE: ICD-10-CM

## 2017-10-10 PROCEDURE — 73552 X-RAY EXAM OF FEMUR 2/>: CPT | Mod: TC,PO,LT

## 2017-10-10 PROCEDURE — 99999 PR PBB SHADOW E&M-EST. PATIENT-LVL III: CPT | Mod: PBBFAC,,, | Performed by: ORTHOPAEDIC SURGERY

## 2017-10-10 PROCEDURE — 99213 OFFICE O/P EST LOW 20 MIN: CPT | Mod: PBBFAC,25,PO | Performed by: ORTHOPAEDIC SURGERY

## 2017-10-10 PROCEDURE — 73552 X-RAY EXAM OF FEMUR 2/>: CPT | Mod: 26,GW,LT, | Performed by: RADIOLOGY

## 2017-10-10 PROCEDURE — 99024 POSTOP FOLLOW-UP VISIT: CPT | Mod: ,,, | Performed by: ORTHOPAEDIC SURGERY

## 2017-10-11 NOTE — PROGRESS NOTES
Subjective:          Chief Complaint: Antonia Case is a 76 y.o. female who had concerns including Pain and Post-op Evaluation of the Left Femur.    Per her family and caregiver, Mrs. Case has been doing well and is here for f/u and likely release from current WB restrictions    DATE: 8/30/2017     PREOPERATIVE DIAGNOSIS:  Left femoral shaft fracture delayed union with failed orthopaedic hardware.     POSTOPERATIVE DIAGNOSIS:  Left femoral shaft fracture delayed union with failed orthopaedic hardware.     PROCEDURE:  Left femoral nail removal from previous IT fracture and open revision ORIF of femoral shaft fracture     SURGEON:  LUCIUS ZELAYA M.D.     ASSISTANT:   SANDRA Baer.     ANESTHESIA:  General and Local.     ESTIMATED BLOOD LOSS:  1200 mL     IVF: 2200mL     URINE OUTPUT: 200mL     EXPLANTS: 11mm x 380mm TFN; 105mm helical tripp; 48mm x 5.0mm distal cortical screw  IMPLANTS: 14-hole large fragment LCP plate; 2-1.7mm cables; proximal cortical screws: 2-4.5x 36mm; 2-4.5x38mm AND 1-5.0x38mm locking screw; distal cortical screws: 2-4.5vum36fq; 1-4.5mm.x42mm; 1-4.4xao62yx; AND 1-5.3epn39lw locking screw; 1-5.0x46mm locking screw      Pain   Associated symptoms include myalgias.       Past Medical History:   Diagnosis Date    Alzheimer disease     Thyroid disease        Past Surgical History:   Procedure Laterality Date    FEMUR SURGERY Left 04/23/2017    HIP FRACTURE SURGERY Left 03/13/2017    KNEE SURGERY         No family history on file.      Current Outpatient Prescriptions:     alprazolam (XANAX) 0.5 MG tablet, Take 0.25 mg by mouth 2 (two) times daily. 0.25mg  With breakfast and lunch routinely and PRN for combative/frantic behavior., Disp: , Rfl:     aspirin 325 MG tablet, Take 1 tablet (325 mg total) by mouth once daily. For 30 days, then decrease back to 81 mg PO daily., Disp: , Rfl: 0    CALCIUM CARBONATE/VITAMIN D3 (VITAMIN D-3 ORAL), Take by mouth., Disp: , Rfl:     escitalopram  oxalate (LEXAPRO) 10 MG tablet, Take 10 mg by mouth once daily., Disp: , Rfl:     flaxseed oil 1,000 mg Cap, Take 1,000 mg by mouth once daily., Disp: , Rfl:     hydrocodone-acetaminophen 5-325mg (NORCO) 5-325 mg per tablet, TAKE 1 TABLET BY MOUTH EVERY 8 HOURS AS NEEDED FOR PAIN, Disp: 60 tablet, Rfl: 0    levothyroxine (SYNTHROID) 75 MCG tablet, Take 75 mcg by mouth. M, W, F, Disp: , Rfl:     multivitamin (THERAGRAN) per tablet, Take 1 tablet by mouth once daily., Disp: , Rfl:     quetiapine (SEROQUEL) 25 MG Tab, Take 25 mg by mouth nightly as needed. , Disp: , Rfl:     turmeric root extract 500 mg Cap, Take 500 mg by mouth once daily., Disp: , Rfl:     Review of patient's allergies indicates:  No Known Allergies    Vitals:    10/10/17 1017   BP: 126/60   Pulse: 66   '    Review of Systems   Musculoskeletal: Positive for muscle weakness, myalgias and stiffness.   All other systems reviewed and are negative.                  Objective:        General: Antonia is well-developed, well-nourished, appears stated age, in no acute distress, alert and oriented to time, place and person.     General    Vitals reviewed.  Constitutional: She appears well-developed and well-nourished. No distress.   HENT:   Head: Normocephalic and atraumatic.   Nose: Nose normal.   Cardiovascular: Normal rate.    Pulmonary/Chest: Effort normal.   Neurological: She is alert.     General Musculoskeletal Exam   Gait: abnormal         Left Knee Exam     Inspection   Swelling: absent  Effusion: absent  Deformity: deformity  Bruising: absent    Range of Motion   Extension: abnormal   Flexion: 110     Tests   Patella   Patellar Glide (Quadrants): Lateral - 1 Medial - 1    Comments:  No signs of infection; swelling has resolved; atrophy noted from disuse; decreased hip and knee ROM     + LLD with left being shorter than right as to be expected as well; discrepancy is smaller than pre-op    Vicryl suture visible and removed but no signs of  infection    Muscle Strength   Left Lower Extremity   Hip Abduction: 5/5   Quadriceps:  5/5   Hamstrin/5     Vascular Exam       Left Pulses  Dorsalis Pedis:      2+  Posterior Tibial:      2+        Edema  Left Lower Leg: absent    Current and previous radiographic studies and results were reviewed with the patient:     There is no change in the appearance of the lateral plate and screw device as well as mid femur cerclage wires compared to the prior study.  Degenerative change at the knee joint is stable.  The bones are diffusely osteopenic.  There is some interval healing of the mid shaft femur fracture compared to the prior study.  Holmen medial angulation of the fracture is without significant change.  There is no hardware fracture.  There is no lucency to suggest loosening of hardware.   Impression          1. Slight interval healing of midshaft fracture without hardware complication or other acute abnormality.    2.  Osteopenia.    3.  Stable degenerative change at the knee as previously described.       Assessment:       Encounter Diagnoses   Name Primary?    S/P ORIF (open reduction internal fixation) fracture Yes    Post-op pain     Pain of left femur           Plan:         Can begin activities as tolerated  F/U in 6 weeks with x-rays of the left femur  Will f/u sooner if needed

## 2017-11-20 DIAGNOSIS — M89.8X5 PAIN IN FEMUR: Primary | ICD-10-CM

## 2017-11-21 ENCOUNTER — HOSPITAL ENCOUNTER (OUTPATIENT)
Dept: RADIOLOGY | Facility: HOSPITAL | Age: 77
Discharge: HOME | End: 2017-11-21
Attending: ORTHOPAEDIC SURGERY
Payer: MEDICARE

## 2017-11-21 ENCOUNTER — OFFICE VISIT (OUTPATIENT)
Dept: ORTHOPEDICS | Facility: CLINIC | Age: 77
End: 2017-11-21
Payer: MEDICARE

## 2017-11-21 VITALS
BODY MASS INDEX: 18.43 KG/M2 | DIASTOLIC BLOOD PRESSURE: 62 MMHG | HEIGHT: 63 IN | WEIGHT: 104 LBS | SYSTOLIC BLOOD PRESSURE: 116 MMHG | HEART RATE: 88 BPM

## 2017-11-21 DIAGNOSIS — M89.8X5 PAIN IN FEMUR: ICD-10-CM

## 2017-11-21 DIAGNOSIS — M21.70 ACQUIRED LEG LENGTH DISCREPANCY: ICD-10-CM

## 2017-11-21 DIAGNOSIS — Z87.81 S/P ORIF (OPEN REDUCTION INTERNAL FIXATION) FRACTURE: Primary | ICD-10-CM

## 2017-11-21 DIAGNOSIS — Z98.890 S/P ORIF (OPEN REDUCTION INTERNAL FIXATION) FRACTURE: Primary | ICD-10-CM

## 2017-11-21 DIAGNOSIS — S72.22XD CLOSED DISPLACED SUBTROCHANTERIC FRACTURE OF LEFT FEMUR WITH ROUTINE HEALING, SUBSEQUENT ENCOUNTER: ICD-10-CM

## 2017-11-21 PROCEDURE — 99024 POSTOP FOLLOW-UP VISIT: CPT | Mod: ,,, | Performed by: ORTHOPAEDIC SURGERY

## 2017-11-21 PROCEDURE — 99213 OFFICE O/P EST LOW 20 MIN: CPT | Mod: PBBFAC,25,PO | Performed by: ORTHOPAEDIC SURGERY

## 2017-11-21 PROCEDURE — 73552 X-RAY EXAM OF FEMUR 2/>: CPT | Mod: 26,GW,LT, | Performed by: RADIOLOGY

## 2017-11-21 PROCEDURE — 99999 PR PBB SHADOW E&M-EST. PATIENT-LVL III: CPT | Mod: PBBFAC,,, | Performed by: ORTHOPAEDIC SURGERY

## 2017-11-21 PROCEDURE — 73552 X-RAY EXAM OF FEMUR 2/>: CPT | Mod: TC,PO,LT

## 2017-11-22 NOTE — PROGRESS NOTES
Subjective:          Chief Complaint: Antonia Case is a 77 y.o. female who had concerns including Pain and Post-op Evaluation of the Left Femur (Left femur ORIF ).    Per her family and caregiver, Mrs. Case has been doing well and is actually ambulating independently today in the office. She has been refitted with her left shoe and the lift has been decreased. She has not had any setbacks. Her family () does note that he has to put her in her chair and secure her there in order to ensure she doesn't injure herself if he needs to be away or if the caregiver needs to be away for a brief time. He also notes that he has to bind her feet to prevent her from trying to get out of bed at night, over the rails or at the end of the bed.     Since she her last visit she has increased her activities and has not had any obvious injury, which can only be assumed by her increased level of activity.    DATE: 8/30/2017     PREOPERATIVE DIAGNOSIS:  Left femoral shaft fracture delayed union with failed orthopaedic hardware.     POSTOPERATIVE DIAGNOSIS:  Left femoral shaft fracture delayed union with failed orthopaedic hardware.     PROCEDURE:  Left femoral nail removal from previous IT fracture and open revision ORIF of femoral shaft fracture     SURGEON:  LUCIUS ZELAYA M.D.     ASSISTANT:   SANDRA Baer.     ANESTHESIA:  General and Local.     ESTIMATED BLOOD LOSS:  1200 mL     IVF: 2200mL     URINE OUTPUT: 200mL     EXPLANTS: 11mm x 380mm TFN; 105mm helical tripp; 48mm x 5.0mm distal cortical screw  IMPLANTS: 14-hole large fragment LCP plate; 2-1.7mm cables; proximal cortical screws: 2-4.5x 36mm; 2-4.5x38mm AND 1-5.0x38mm locking screw; distal cortical screws: 2-4.6uvy69bh; 1-4.5mm.x42mm; 1-4.6zrs79us; AND 1-5.2pxj70oy locking screw; 1-5.0x46mm locking screw      Pain         Past Medical History:   Diagnosis Date    Alzheimer disease     Thyroid disease        Past Surgical History:   Procedure Laterality Date     FEMUR SURGERY Left 04/23/2017    HIP FRACTURE SURGERY Left 03/13/2017    KNEE SURGERY         History reviewed. No pertinent family history.      Current Outpatient Prescriptions:     alprazolam (XANAX) 0.5 MG tablet, Take 0.25 mg by mouth 2 (two) times daily. 0.25mg  With breakfast and lunch routinely and PRN for combative/frantic behavior., Disp: , Rfl:     aspirin 325 MG tablet, Take 1 tablet (325 mg total) by mouth once daily. For 30 days, then decrease back to 81 mg PO daily., Disp: , Rfl: 0    CALCIUM CARBONATE/VITAMIN D3 (VITAMIN D-3 ORAL), Take by mouth., Disp: , Rfl:     escitalopram oxalate (LEXAPRO) 10 MG tablet, Take 10 mg by mouth once daily., Disp: , Rfl:     flaxseed oil 1,000 mg Cap, Take 1,000 mg by mouth once daily., Disp: , Rfl:     hydrocodone-acetaminophen 5-325mg (NORCO) 5-325 mg per tablet, TAKE 1 TABLET BY MOUTH EVERY 8 HOURS AS NEEDED FOR PAIN, Disp: 60 tablet, Rfl: 0    levothyroxine (SYNTHROID) 75 MCG tablet, Take 75 mcg by mouth. M, W, F, Disp: , Rfl:     multivitamin (THERAGRAN) per tablet, Take 1 tablet by mouth once daily., Disp: , Rfl:     quetiapine (SEROQUEL) 25 MG Tab, Take 25 mg by mouth nightly as needed. , Disp: , Rfl:     turmeric root extract 500 mg Cap, Take 500 mg by mouth once daily., Disp: , Rfl:     Review of patient's allergies indicates:  No Known Allergies    Vitals:    11/21/17 1046   BP: 116/62   Pulse: 88   '    Review of Systems   Musculoskeletal: Positive for stiffness.   All other systems reviewed and are negative.                  Objective:        General: Antonia is well-developed, well-nourished, appears stated age, in no acute distress, alert and oriented to time, place and person.     General    Vitals reviewed.  Constitutional: She appears well-developed and well-nourished. No distress.   HENT:   Head: Normocephalic and atraumatic.   Nose: Nose normal.   Cardiovascular: Normal rate.    Pulmonary/Chest: Effort normal.   Neurological: She is  alert.             Left Knee Exam     Inspection   Swelling: absent  Effusion: absent  Deformity: deformity  Bruising: absent    Range of Motion   Extension: abnormal   Flexion: 110     Comments:  Today Mrs. Case is up and down from the chair and walking around the exam room without much difficulty. Her gait is shuffling in nature.    + LLD however not very noticeable on ambulation        Muscle Strength   Left Lower Extremity   Hip Abduction: 5/5   Quadriceps:  5/5   Hamstrin/5     Vascular Exam       Left Pulses  Dorsalis Pedis:      2+  Posterior Tibial:      2+        Edema  Left Lower Leg: absent    Current and previous radiographic studies and results were reviewed with the patient:   There is postoperative change of ORIF of a displaced, angulated left femoral mid shaft fracture utilizing a lateral screw plate construct and cerclage wires.  There has been interval progression of healing when compared to the previous study.  There is persistent apex medial angulation at the fracture apex.  There is degenerative enthesophyte formation at the attachment of the left gluteus tendons on the left greater trochanter.  No new fractures.  There is degenerative change of the symphysis pubis and left hip.  No evidence of osteonecrosis of the left femoral head.  There is a ghost screw track present within the left femoral head, neck, and intertrochanteric region.    Assessment:       Encounter Diagnoses   Name Primary?    S/P ORIF (open reduction internal fixation) fracture Yes    Closed displaced subtrochanteric fracture of left femur with routine healing, subsequent encounter     Acquired leg length discrepancy           Plan:         Can continue with activities as tolerated  Discussed ensuring bed rail protection, which is now with vinyl padding that was made by her  to protect the patient from getting her legs caught in between the rails.  F/U PRN

## 2017-11-30 ENCOUNTER — TELEPHONE (OUTPATIENT)
Dept: ORTHOPEDICS | Facility: CLINIC | Age: 77
End: 2017-11-30

## 2017-11-30 NOTE — TELEPHONE ENCOUNTER
----- Message from Delmis Dupree sent at 11/30/2017 11:38 AM CST -----  Contact:  Mr   Patients  is requesting that you add an xray of her left ankle please.  Call back [phone] (home).  Thank You!

## 2017-11-30 NOTE — TELEPHONE ENCOUNTER
Spoke to  to see if the ankle injury was something new and he said he thinks that it is part of her current condition. I also asked if he felt like the patient needed to be seen sooner and he stated that the current appt time was good and he did not need to be seen before then.

## 2017-12-04 PROBLEM — G89.18 POST-OP PAIN: Status: RESOLVED | Noted: 2017-08-30 | Resolved: 2017-12-04

## 2017-12-18 DIAGNOSIS — M25.572 LEFT ANKLE PAIN, UNSPECIFIED CHRONICITY: ICD-10-CM

## 2017-12-18 DIAGNOSIS — M25.552 LEFT HIP PAIN: Primary | ICD-10-CM

## 2017-12-22 ENCOUNTER — OFFICE VISIT (OUTPATIENT)
Dept: ORTHOPEDICS | Facility: CLINIC | Age: 77
End: 2017-12-22
Payer: MEDICARE

## 2017-12-22 ENCOUNTER — HOSPITAL ENCOUNTER (OUTPATIENT)
Dept: RADIOLOGY | Facility: HOSPITAL | Age: 77
Discharge: HOME | End: 2017-12-22
Attending: ORTHOPAEDIC SURGERY
Payer: MEDICARE

## 2017-12-22 VITALS — BODY MASS INDEX: 18.43 KG/M2 | WEIGHT: 104 LBS | HEIGHT: 63 IN

## 2017-12-22 DIAGNOSIS — Z98.890 S/P ORIF (OPEN REDUCTION INTERNAL FIXATION) FRACTURE: ICD-10-CM

## 2017-12-22 DIAGNOSIS — Z87.81 S/P ORIF (OPEN REDUCTION INTERNAL FIXATION) FRACTURE: ICD-10-CM

## 2017-12-22 DIAGNOSIS — M25.552 LEFT HIP PAIN: ICD-10-CM

## 2017-12-22 DIAGNOSIS — M25.572 LEFT ANKLE PAIN, UNSPECIFIED CHRONICITY: ICD-10-CM

## 2017-12-22 DIAGNOSIS — M25.552 LEFT HIP PAIN: Primary | ICD-10-CM

## 2017-12-22 DIAGNOSIS — M21.70 ACQUIRED LEG LENGTH DISCREPANCY: ICD-10-CM

## 2017-12-22 DIAGNOSIS — S72.22XD CLOSED DISPLACED SUBTROCHANTERIC FRACTURE OF LEFT FEMUR WITH ROUTINE HEALING, SUBSEQUENT ENCOUNTER: ICD-10-CM

## 2017-12-22 PROCEDURE — 99999 PR PBB SHADOW E&M-EST. PATIENT-LVL II: CPT | Mod: PBBFAC,,, | Performed by: ORTHOPAEDIC SURGERY

## 2017-12-22 PROCEDURE — 73610 X-RAY EXAM OF ANKLE: CPT | Mod: TC,PO,LT

## 2017-12-22 PROCEDURE — 99212 OFFICE O/P EST SF 10 MIN: CPT | Mod: PBBFAC,25,PN | Performed by: ORTHOPAEDIC SURGERY

## 2017-12-22 PROCEDURE — 73502 X-RAY EXAM HIP UNI 2-3 VIEWS: CPT | Mod: 26,GW,LT, | Performed by: RADIOLOGY

## 2017-12-22 PROCEDURE — 99213 OFFICE O/P EST LOW 20 MIN: CPT | Mod: S$PBB,GW,, | Performed by: ORTHOPAEDIC SURGERY

## 2017-12-22 PROCEDURE — 73610 X-RAY EXAM OF ANKLE: CPT | Mod: 26,GW,LT, | Performed by: RADIOLOGY

## 2017-12-22 PROCEDURE — 73502 X-RAY EXAM HIP UNI 2-3 VIEWS: CPT | Mod: TC,PO,LT

## 2017-12-22 NOTE — PROGRESS NOTES
Subjective:          Chief Complaint: Antonia Case is a 77 y.o. female who had concerns including Pain of the Left Hip.    Per her family and caregiver, Mrs. Case has been doing well and is ambulating independently today in the office. Her  states that he loves that she is better and back to her baseline levels of activity. She is tough to slow down per his report.    She has been refitted with her left shoe and the lift has been decreased. She has not had any setbacks in so far as her left lower extremity, but the caregiver stated Mrs. Case did fall and hit her head in the bathroom since her last visit. Her family () does note that he has to put her in her chair and secure her there in order to ensure she doesn't injure herself if he needs to be away or if the caregiver needs to be away for a brief time. He also notes that he has to bind her feet to prevent her from trying to get out of bed at night or over the rails or at the end of the bed at times.    Her left foot and ankle are a concern to them this morning, not because of pain but because alignment. It seems to becoming more flat.     Since she her last visit she has increased her activities.    DATE: 8/30/2017     PREOPERATIVE DIAGNOSIS:  Left femoral shaft fracture delayed union with failed orthopaedic hardware.     POSTOPERATIVE DIAGNOSIS:  Left femoral shaft fracture delayed union with failed orthopaedic hardware.     PROCEDURE:  Left femoral nail removal from previous IT fracture and open revision ORIF of femoral shaft fracture     SURGEON:  LUCIUS ZELAYA M.D.     ASSISTANT:   SANDRA Baer.     ANESTHESIA:  General and Local.     ESTIMATED BLOOD LOSS:  1200 mL     IVF: 2200mL     URINE OUTPUT: 200mL     EXPLANTS: 11mm x 380mm TFN; 105mm helical tripp; 48mm x 5.0mm distal cortical screw  IMPLANTS: 14-hole large fragment LCP plate; 2-1.7mm cables; proximal cortical screws: 2-4.5x 36mm; 2-4.5x38mm AND 1-5.0x38mm locking screw;  distal cortical screws: 2-4.9dgo33bz; 1-4.5mm.x42mm; 1-4.3vet28fu; AND 1-5.6agv56bj locking screw; 1-5.0x46mm locking screw        Past Medical History:   Diagnosis Date    Alzheimer disease     Thyroid disease        Past Surgical History:   Procedure Laterality Date    FEMUR SURGERY Left 04/23/2017    HIP FRACTURE SURGERY Left 03/13/2017    KNEE SURGERY         History reviewed. No pertinent family history.      Current Outpatient Prescriptions:     alprazolam (XANAX) 0.5 MG tablet, Take 0.25 mg by mouth 2 (two) times daily. 0.25mg  With breakfast and lunch routinely and PRN for combative/frantic behavior., Disp: , Rfl:     aspirin 325 MG tablet, Take 1 tablet (325 mg total) by mouth once daily. For 30 days, then decrease back to 81 mg PO daily., Disp: , Rfl: 0    CALCIUM CARBONATE/VITAMIN D3 (VITAMIN D-3 ORAL), Take by mouth., Disp: , Rfl:     escitalopram oxalate (LEXAPRO) 10 MG tablet, Take 10 mg by mouth once daily., Disp: , Rfl:     flaxseed oil 1,000 mg Cap, Take 1,000 mg by mouth once daily., Disp: , Rfl:     hydrocodone-acetaminophen 5-325mg (NORCO) 5-325 mg per tablet, TAKE 1 TABLET BY MOUTH EVERY 8 HOURS AS NEEDED FOR PAIN, Disp: 60 tablet, Rfl: 0    levothyroxine (SYNTHROID) 75 MCG tablet, Take 75 mcg by mouth. M, W, F, Disp: , Rfl:     multivitamin (THERAGRAN) per tablet, Take 1 tablet by mouth once daily., Disp: , Rfl:     quetiapine (SEROQUEL) 25 MG Tab, Take 25 mg by mouth nightly as needed. , Disp: , Rfl:     turmeric root extract 500 mg Cap, Take 500 mg by mouth once daily., Disp: , Rfl:     Review of patient's allergies indicates:  No Known Allergies    There were no vitals filed for this visit.'    Difficult to obtain vitals today    Review of Systems   Musculoskeletal: Positive for stiffness.   All other systems reviewed and are negative.                  Objective:        General: Antonia is well-developed, well-nourished, appears stated age, in no acute distress, alert and oriented  to time, place and person.     General    Vitals reviewed.  Constitutional: She appears well-developed and well-nourished. No distress.   HENT:   Head: Normocephalic and atraumatic.   Nose: Nose normal.   Cardiovascular: Normal rate.    Pulmonary/Chest: Effort normal.   Neurological: She is alert.         Left Ankle/Foot Exam     Alignment   Knee Alignment: valgus  Hindfoot Alignment: neutral  Midfoot Alignment: flexible planus  Forefoot Alignment: pronated    Tests   Heel Walk: unable to perform  Tiptoe Walk: unable to perform  Single Heel Rise: unable to perform    Comments:  Unable to follow commands regarding toe raise/heel walk, etc.      Left Knee Exam     Inspection   Swelling: absent  Effusion: absent  Deformity: deformity  Bruising: absent    Range of Motion   Extension: abnormal   Flexion: 110     Comments:  Today Mrs. Case is up and down from the chair and walking around the exam room without much difficulty. Her gait is crouched and shuffling in nature.    + LLD however not very noticeable on ambulation    Left Hip Exam     Inspection   Scars: present  Quadriceps Atrophy:  positive          Muscle Strength   Left Lower Extremity   Hip Abduction: 5/5   Hip Adduction: 5/5   Hip Flexion: 5/5   Quadriceps:  5/5   Hamstrin/5   Ankle Dorsiflexion:  5/5     Vascular Exam       Left Pulses  Dorsalis Pedis:      2+  Posterior Tibial:      2+        Edema  Left Lower Leg: absent    Current and previous radiographic studies and results were reviewed with the patient:   There is postoperative change of ORIF of a displaced, angulated left femoral mid shaft fracture utilizing a lateral screw plate construct and cerclage wires.  There has been interval progression of healing when compared to the previous study.  There is persistent apex medial angulation at the fracture apex.  There is degenerative enthesophyte formation at the attachment of the left gluteus tendons on the left greater trochanter.  No new  fractures.  There is degenerative change of the symphysis pubis and left hip.  No evidence of osteonecrosis of the left femoral head.  There is a ghost screw track present within the left femoral head, neck, and intertrochanteric region.    Ankle:  Findings: The bones are diffusely osteopenic. There is no fracture or dislocation. There is degenerative spurring about the tip of the medial malleolus. A small Achilles enthesophyte is present.   Impression      No acute osseous abnormality. Osteopenia and chronic changes as above.         Hip:  FINDINGS: There has been ORIF of the left femoral shaft with a lateral plate and screw device and 2 cerclage wires through a mid shaft femoral fracture. There is an old healed fracture of the intertrochanteric left femur. There is mild medial apex angulation of the midshaft of the femur without change in alignment. All hardware components remain intact and engaged. There is no new abnormality. The left femur is osteopenic.   Impression      Status post ORIF of a healing left midshaft femoral fracture with intact hardware and no change in alignment. Chronic left intertrochanteric fracture. Osteopenia.         Assessment:       Encounter Diagnoses   Name Primary?    Left hip pain Yes    S/P ORIF (open reduction internal fixation) fracture     Acquired leg length discrepancy     Closed displaced subtrochanteric fracture of left femur with routine healing, subsequent encounter           Plan:         Can continue with activities as tolerated  Discussed ensuring bed rail protection and ambulatory protection to decreases incidence of falls  F/U PRN

## 2018-03-27 ENCOUNTER — OFFICE VISIT (OUTPATIENT)
Dept: PODIATRY | Facility: CLINIC | Age: 78
End: 2018-03-27

## 2018-03-27 VITALS — HEIGHT: 63 IN | BODY MASS INDEX: 18.44 KG/M2 | WEIGHT: 104.06 LBS

## 2018-03-27 DIAGNOSIS — B35.1 ONYCHOMYCOSIS DUE TO DERMATOPHYTE: Primary | ICD-10-CM

## 2018-03-27 PROCEDURE — 99999 PR PBB SHADOW E&M-EST. PATIENT-LVL III: CPT | Mod: PBBFAC,,, | Performed by: PODIATRIST

## 2018-03-27 PROCEDURE — 17999 UNLISTD PX SKN MUC MEMB SUBQ: CPT | Mod: S$GLB,,, | Performed by: PODIATRIST

## 2018-03-27 PROCEDURE — 99499 UNLISTED E&M SERVICE: CPT | Mod: DBM,S$GLB,, | Performed by: PODIATRIST

## 2018-06-15 DIAGNOSIS — M25.551 RIGHT HIP PAIN: Primary | ICD-10-CM

## 2018-06-18 ENCOUNTER — OFFICE VISIT (OUTPATIENT)
Dept: ORTHOPEDICS | Facility: CLINIC | Age: 78
End: 2018-06-18
Payer: MEDICARE

## 2018-06-18 ENCOUNTER — HOSPITAL ENCOUNTER (OUTPATIENT)
Dept: RADIOLOGY | Facility: HOSPITAL | Age: 78
Discharge: HOME | End: 2018-06-18
Attending: ORTHOPAEDIC SURGERY
Payer: MEDICARE

## 2018-06-18 VITALS — HEIGHT: 63 IN | BODY MASS INDEX: 18.43 KG/M2 | WEIGHT: 104 LBS

## 2018-06-18 DIAGNOSIS — M16.11 PRIMARY OSTEOARTHRITIS OF RIGHT HIP: ICD-10-CM

## 2018-06-18 DIAGNOSIS — M25.551 RIGHT HIP PAIN: ICD-10-CM

## 2018-06-18 DIAGNOSIS — M25.551 RIGHT HIP PAIN: Primary | ICD-10-CM

## 2018-06-18 PROCEDURE — 73502 X-RAY EXAM HIP UNI 2-3 VIEWS: CPT | Mod: 26,GW,RT, | Performed by: RADIOLOGY

## 2018-06-18 PROCEDURE — 99213 OFFICE O/P EST LOW 20 MIN: CPT | Mod: GW,S$PBB,, | Performed by: ORTHOPAEDIC SURGERY

## 2018-06-18 PROCEDURE — 99999 PR PBB SHADOW E&M-EST. PATIENT-LVL II: CPT | Mod: PBBFAC,,, | Performed by: ORTHOPAEDIC SURGERY

## 2018-06-18 PROCEDURE — 99212 OFFICE O/P EST SF 10 MIN: CPT | Mod: PBBFAC,25,PN | Performed by: ORTHOPAEDIC SURGERY

## 2018-06-18 PROCEDURE — 73502 X-RAY EXAM HIP UNI 2-3 VIEWS: CPT | Mod: TC,PO,RT

## 2018-06-18 NOTE — PROGRESS NOTES
Antonia Case 77 years old, severe Alzheimer's here today with her .  She is   concerned about the crunching noise in her hip for a few months' time.  She   walks with an unsteady gait, but she appears to be pain-free when she ambulates.    Hip range of motion is pain-free.  A little bit tender in her lumbar spine   area.    X-rays of the hips show well-preserved joint spacing.    ASSESSMENT:  Crunching in hip in a 77-year-old with severe dementia.    PLAN:  We are going to give her reassurance to treat conservatively.  Continue   with activities to tolerance.  We recommend some type of assistive devices to   try and prevent falls as best we can.  We will see her back as needed.      FLYNN/ASUNCION  dd: 06/18/2018 11:26:45 (CDT)  td: 06/19/2018 07:56:38 (CDT)  Doc ID   #[id number]  Job ID #[id number]    CC:

## 2019-05-30 ENCOUNTER — OFFICE VISIT (OUTPATIENT)
Dept: ORTHOPEDICS | Facility: CLINIC | Age: 79
End: 2019-05-30
Payer: MEDICARE

## 2019-05-30 VITALS — WEIGHT: 89 LBS | BODY MASS INDEX: 15.77 KG/M2 | HEIGHT: 63 IN

## 2019-05-30 DIAGNOSIS — Z98.890 S/P ORIF (OPEN REDUCTION INTERNAL FIXATION) FRACTURE: ICD-10-CM

## 2019-05-30 DIAGNOSIS — M25.552 LEFT HIP PAIN: ICD-10-CM

## 2019-05-30 DIAGNOSIS — M16.11 PRIMARY OSTEOARTHRITIS OF RIGHT HIP: ICD-10-CM

## 2019-05-30 DIAGNOSIS — M25.551 RIGHT HIP PAIN: Primary | ICD-10-CM

## 2019-05-30 DIAGNOSIS — Z87.81 S/P ORIF (OPEN REDUCTION INTERNAL FIXATION) FRACTURE: ICD-10-CM

## 2019-05-30 PROCEDURE — 99999 PR PBB SHADOW E&M-EST. PATIENT-LVL II: ICD-10-PCS | Mod: PBBFAC,,, | Performed by: ORTHOPAEDIC SURGERY

## 2019-05-30 PROCEDURE — 99999 PR PBB SHADOW E&M-EST. PATIENT-LVL II: CPT | Mod: PBBFAC,,, | Performed by: ORTHOPAEDIC SURGERY

## 2019-05-30 PROCEDURE — 99212 OFFICE O/P EST SF 10 MIN: CPT | Mod: PBBFAC,PN | Performed by: ORTHOPAEDIC SURGERY

## 2019-05-30 PROCEDURE — 99213 OFFICE O/P EST LOW 20 MIN: CPT | Mod: S$PBB,,, | Performed by: ORTHOPAEDIC SURGERY

## 2019-05-30 PROCEDURE — 99213 PR OFFICE/OUTPT VISIT, EST, LEVL III, 20-29 MIN: ICD-10-PCS | Mod: S$PBB,,, | Performed by: ORTHOPAEDIC SURGERY

## 2019-05-31 NOTE — PROGRESS NOTES
[age] years old, comes in today with her .  She has severe   Alzheimer's.  He again is concerned about the crunching noise in the hip.  We   had seen her for the same issue back in June 2018.  He had an x-ray of the hip   recently, which does show some degenerative changes.    ASSESSMENT:  Degenerative changes in a 78-year-old with severe Alzheimer's.    PLAN:  Conservative care, reassurance, follow up as needed.      JONNY  dd: 05/30/2019 14:53:04 (CDT)  td: 05/31/2019 02:32:28 (CDT)  Doc ID   #[id number]  Job ID #[id number]    CC:

## 2019-10-04 PROBLEM — D72.829 LEUKOCYTOSIS: Status: ACTIVE | Noted: 2019-10-04

## 2019-10-04 PROBLEM — E03.9 HYPOTHYROID: Status: ACTIVE | Noted: 2017-04-23

## 2025-01-17 NOTE — PROGRESS NOTES
Ms. Knight's  is here for f//u after her left femur surgery. She fell previously and sustained a left IT fracture of the hip which was treated with a femoral nailing. She was doing well until she subsequently fell and fracture her femoral shaft at the end of the previously placed IM nail. The previous nail was removed and the femur was then treated with a  Long IM CM nail. She was doing well and progressing with ambulation however she has failure of her distal hardware and is here for f/u. She seems to be better today insofar as her demeanor and being able to cooperate with exam and x-rays. She was ambulating at her last visit with a shoe lift. That was the first time I had seen her ambulate.     She is non-verbal and has a diagnosis of advanced dementia. She requires 24 hr monitoring in order to protect herself from injury per her .    Current and previous radiographic studies and results were reviewed with the patient's     Today's visit was to discuss the next step in management in Mrs. Manpreet acosta. Her  was present however his wife was not, which is appropriate secondary to her  have full decision planning/making responsibility.    The plan at this time will be to continue to monitor the patient. IF she begins to have more trouble with ambulating and/or if the nail breeches the medial femoral condyle cortex we will proceed with:   1. Nail removal  2. ORIF to regain femoral length and plating  3. LLCast to prevent WB    She will f/u in 3 weeks for new x-rays and ROM/gati check.           Spoke with mother of patient to let her know that this has been taken care of and she can picl the meds up now. No further questions or concerns.